# Patient Record
Sex: FEMALE | Race: WHITE | NOT HISPANIC OR LATINO | Employment: UNEMPLOYED | URBAN - METROPOLITAN AREA
[De-identification: names, ages, dates, MRNs, and addresses within clinical notes are randomized per-mention and may not be internally consistent; named-entity substitution may affect disease eponyms.]

---

## 2021-02-23 ENCOUNTER — IMMUNIZATION (OUTPATIENT)
Dept: VACCINE CLINIC | Facility: HOSPITAL | Age: 66
End: 2021-02-23

## 2021-02-23 PROCEDURE — 0001A: CPT | Performed by: INTERNAL MEDICINE

## 2021-02-23 PROCEDURE — 91300 HC SARSCOV02 VAC 30MCG/0.3ML IM: CPT | Performed by: INTERNAL MEDICINE

## 2021-03-16 ENCOUNTER — IMMUNIZATION (OUTPATIENT)
Dept: VACCINE CLINIC | Facility: HOSPITAL | Age: 66
End: 2021-03-16

## 2021-03-16 PROCEDURE — 0002A: CPT | Performed by: INTERNAL MEDICINE

## 2021-03-16 PROCEDURE — 91300 HC SARSCOV02 VAC 30MCG/0.3ML IM: CPT | Performed by: INTERNAL MEDICINE

## 2023-06-27 PROBLEM — Z12.11 COLON CANCER SCREENING: Status: ACTIVE | Noted: 2018-11-19

## 2023-06-27 PROBLEM — R52 PAIN: Status: ACTIVE | Noted: 2023-06-27

## 2023-06-27 PROBLEM — N89.5 VAGINAL STENOSIS: Status: ACTIVE | Noted: 2017-11-17

## 2023-07-13 PROBLEM — M17.12 PRIMARY OSTEOARTHRITIS OF LEFT KNEE: Status: ACTIVE | Noted: 2023-07-13

## 2023-09-07 ENCOUNTER — TELEPHONE (OUTPATIENT)
Dept: ORTHOPEDIC SURGERY | Facility: CLINIC | Age: 68
End: 2023-09-07
Payer: COMMERCIAL

## 2023-09-07 NOTE — TELEPHONE ENCOUNTER
Caller: LYNETTE     Relationship to patient: SELF     Best call back number: [phone]    Chief complaint: L KNEE     Type of visit: SURGERY     Requested date: FOLLOWING WEEK , CLOSER TO [place]      If rescheduling, when is the original appointment: DECEMBER 11TH 2023      Additional notes: PT NEEDS TO RS SX FOR FOLLOWING WEEK

## 2023-09-18 ENCOUNTER — TELEPHONE (OUTPATIENT)
Dept: ORTHOPEDIC SURGERY | Facility: CLINIC | Age: 68
End: 2023-09-18
Payer: COMMERCIAL

## 2023-09-18 DIAGNOSIS — M17.12 PRIMARY OSTEOARTHRITIS OF LEFT KNEE: Primary | ICD-10-CM

## 2023-09-18 NOTE — TELEPHONE ENCOUNTER
Caller: Oumou Jacinto    Relationship: Self    Best call back number: [phone]    What orders are you requesting (i.e. lab or imaging): LEFT KNEE PHYSICAL THERAPY     In what timeframe would the patient need to come in: ASAP     Where will you receive your lab/imaging services: [facility] - PATIENT WOULD LIKE A CALL WHEN THE ORDER IS PLACED

## 2023-09-21 ENCOUNTER — TRANSCRIBE ORDERS (OUTPATIENT)
Dept: ADMINISTRATIVE | Facility: HOSPITAL | Age: 68
End: 2023-09-21

## 2023-09-21 DIAGNOSIS — Z12.31 SCREENING MAMMOGRAM, ENCOUNTER FOR: Primary | ICD-10-CM

## 2023-10-05 ENCOUNTER — HOSPITAL ENCOUNTER (OUTPATIENT)
Dept: MAMMOGRAPHY | Facility: HOSPITAL | Age: 68
Discharge: HOME | End: 2023-10-05
Admitting: OBSTETRICS & GYNECOLOGY
Payer: COMMERCIAL

## 2023-10-05 DIAGNOSIS — Z12.31 SCREENING MAMMOGRAM, ENCOUNTER FOR: ICD-10-CM

## 2023-10-05 PROCEDURE — 77063 BREAST TOMOSYNTHESIS BI: CPT

## 2023-10-05 PROCEDURE — 77067 SCR MAMMO BI INCL CAD: CPT

## 2023-10-10 ENCOUNTER — TREATMENT (OUTPATIENT)
Dept: PHYSICAL THERAPY | Facility: CLINIC | Age: 68
End: 2023-10-10
Payer: COMMERCIAL

## 2023-10-10 DIAGNOSIS — R52 PAIN AGGRAVATED BY STANDING: ICD-10-CM

## 2023-10-10 DIAGNOSIS — M25.662 STIFFNESS OF KNEE JOINT, LEFT: ICD-10-CM

## 2023-10-10 DIAGNOSIS — R52 PAIN AGGRAVATED BY WALKING: ICD-10-CM

## 2023-10-10 DIAGNOSIS — M17.0 PRIMARY OSTEOARTHRITIS OF BOTH KNEES: Primary | ICD-10-CM

## 2023-10-10 PROCEDURE — 97110 THERAPEUTIC EXERCISES: CPT | Performed by: PHYSICAL THERAPIST

## 2023-10-10 PROCEDURE — 97162 PT EVAL MOD COMPLEX 30 MIN: CPT | Performed by: PHYSICAL THERAPIST

## 2023-10-10 NOTE — PROGRESS NOTES
Physical Therapy Initial Evaluation and Plan of Care    [facility] [place] [address]  [phone] (phone)  [phone] (fax)      Patient: Oumou Jacinto   : 1955  Diagnosis/ICD-10 Code:  Primary osteoarthritis of both knees [M17.0]  Referring practitioner: Markus Addison MD  Date of Initial Visit: 10/10/2023  Today's Date: 10/10/2023  Patient seen for 1 sessions    Visit Diagnoses:    ICD-10-CM ICD-9-CM   1. Primary osteoarthritis of both knees  M17.0 715.16   2. Pain aggravated by standing  R52 780.96   3. Stiffness of knee joint, left  M25.662 719.56   4. Pain aggravated by walking  R52 780.96              Subjective Evaluation    History of Present Illness  Onset date: Chronic.  Mechanism of injury: She is scheduled for surgery on 23 for L TKA.   Both knees are bone on bone.  The left knee bothers her more.  She has a more sedentary lifestyle.  She is in remission for autoimmune disorder.  She has seasonal asthma.  Knee hurts with weather changes.  It is difficult to get up and she cannot get down to floor with grand kids.       Patient Occupation: Retired teacher Pain  Current pain ratin  At worst pain ratin  Location: Left knee  Aggravating factors: standing and ambulation    Social Support  Lives in: multiple-level home  Lives with: spouse    Patient Goals  Patient/family treatment goals: prepare for knee surgery.             Objective          Static Posture     Knee   Genu valgus.     Tenderness   Left Knee   Tenderness in the lateral joint line.     Active Range of Motion   Left Knee   Flexion: 111 degrees   Extension: 7 degrees with pain    Right Knee   Flexion: 120 degrees   Extension: 0 degrees     Passive Range of Motion   Left Knee   Extension: 2 degrees with pain    Strength/Myotome Testing     Left Hip   Planes of Motion   Flexion: 4  Abduction: 4    Right Hip   Planes of Motion   Flexion: 4+  Abduction:  4+    Left Knee   Flexion: 4  Extension: 4  Quadriceps contraction: fair    Right Knee   Flexion: 5  Extension: 5  Quadriceps contraction: good    Additional Strength Details  Left knee limited by increased pain     Left Knee Flexibility Comments:   Mild hamstring tightness     Ambulation   Weight-Bearing Status   Assistive device used: none    Observational Gait   Gait: antalgic   Decreased walking speed, left stance time and left step length.     Functional Assessment     Comments  Increased left knee pain lying with left knee extended straight   She feels more comfortable sitting with left leg crossed over right         See Exercise, Manual, and Modality Logs for complete treatment.   Issued HEP:  Straight leg raise  Bridges  Hamstring stretch  Sidelying hip abduction   Ice pack around knee after surgery     Functional Outcome Score: KOS ADL score is 50% ability that is 50% disability     Assessment & Plan       Assessment  Impairments: abnormal gait, abnormal muscle firing, abnormal or restricted ROM, activity intolerance, impaired physical strength, lacks appropriate home exercise program and pain with function   Functional limitations: walking, uncomfortable because of pain, sitting, standing and stooping   Assessment details: Oumou Jacinto is a 67 y.o. year-old female referred to physical therapy for pre-hab for left TKA scheduled for 12/18/23. She presents with an evolving clinical presentation.  She has comorbidities of right knee osteoarthritis and personal factors that she is not very physically active may affect her progress in the plan of care.  Signs and symptoms are consistent with physical therapy diagnosis of end-stage osteoarthritis of the left knee with limited and painful knee flexion, decreased knee strength and impaired functional mobility.  She will benefit from a few sessions for exercises to help her gain knee flexibility and strength prior to surgery..   Prognosis: good    Goals  Plan  Goals: STGs to be achieved by 23    STG 1 Patient will demonstrate an independent HEP for knee strength and ROM/flexibility.    STG 2 Patient will demonstrate increased left knee extension from 2 to 0 for better quadriceps muscle activation     STG 3 Patient will increase her left hip and  knee strength to 4+/5 prior to surgery     LTGs Deferred        Plan  Therapy options: will be seen for skilled therapy services  Planned therapy interventions: home exercise program, therapeutic activities, stretching, strengthening, manual therapy and neuromuscular re-education  Frequency: 2x month  Duration in weeks: 12  Treatment plan discussed with: patient        Timed:         Manual Therapy:    0     mins  14542;     Therapeutic Exercise:    10     mins  76180;     Neuromuscular Park:    0    mins  20975;    Therapeutic Activity:     0     mins  03605;     Gait Trainin     mins  87961;     Ultrasound:     0     mins  23033;    Self Care                       0     mins   79639      Un-Timed:  Electrical Stimulation:    0     mins  28663 ( );  Dry Needling  (1-2 muscles)            0     mins 27136 (Self-pay)  Dry Needling (3-4 muscles) 0     mins 17899 (Self-pay)  Dry Needling Trial    0     mins DRYNDLTRIAL  (No Charge)  Traction     0     mins 15504  Low Eval     0     Mins  18042  Mod Eval     28     Mins  17892  High Eval                       0     Mins  90466    Timed Treatment:   10   mins   Total Treatment:     38   mins    PT SIGNATURE: Harmony Ellis PT     [place] License Number: [id number]    Electronically signed by Harmony Ellis PT, 10/10/23, 2:28 PM EDT    DATE TREATMENT INITIATED: 10/10/2023    Initial Certification  Certification Period: 2024  I certify that the therapy services are furnished while this patient is under my care.  The services outlined above are required by this patient, and will be reviewed every 90 days.     PHYSICIAN: Markus Addison MD   NPI: [id number]                                          DATE:     Please sign and return via fax to [phone] Thank you, [facility].

## 2023-10-27 ENCOUNTER — TELEPHONE (OUTPATIENT)
Dept: ORTHOPEDIC SURGERY | Facility: CLINIC | Age: 68
End: 2023-10-27
Payer: COMMERCIAL

## 2023-10-27 NOTE — TELEPHONE ENCOUNTER
Hub staff attempted to follow warm transfer process and was unsuccessful     Caller: Oumou Jacinto    Relationship to patient: Self    Best call back number: [phone]    Patient is needing: PATIENT RECEIVED A BILL FOR HER UPCOMING SURGERY WITH DR YODER THAT STATED SHE WOULD OWE 68,000 DOLLARS. HER INSURANCE WAS NOT IN THE CHART COMPLETELY CORRECT AND HAS BEEN UPDATED.  THE PATIENT IS NEEDING THIS BILL RAN AGAIN TO MAKE SURE THAT SHE IS ABLE GET THI SX.   SHE HAS OTHER CONCERNS THAT HAVE BEEN SENT IN A SEPARATE MESSAGE TO THE OFFICE.   PLEASE ADVISE THIS PATIENT ASAP.

## 2023-10-27 NOTE — TELEPHONE ENCOUNTER
Per patient insurance for surgery she needs a precert.  Her insurance pays 80/20, no deductible, $1000 OOP and she has met $752.45. per Racquel ATKINSON ref#[id number]    Called and left patient a message

## 2023-11-02 ENCOUNTER — TREATMENT (OUTPATIENT)
Dept: PHYSICAL THERAPY | Facility: CLINIC | Age: 68
End: 2023-11-02
Payer: COMMERCIAL

## 2023-11-02 DIAGNOSIS — M17.0 PRIMARY OSTEOARTHRITIS OF BOTH KNEES: Primary | ICD-10-CM

## 2023-11-02 DIAGNOSIS — R52 PAIN AGGRAVATED BY STANDING: ICD-10-CM

## 2023-11-02 DIAGNOSIS — R52 PAIN AGGRAVATED BY WALKING: ICD-10-CM

## 2023-11-02 DIAGNOSIS — M25.662 STIFFNESS OF KNEE JOINT, LEFT: ICD-10-CM

## 2023-11-02 PROCEDURE — 97530 THERAPEUTIC ACTIVITIES: CPT | Performed by: PHYSICAL THERAPIST

## 2023-11-02 PROCEDURE — 97110 THERAPEUTIC EXERCISES: CPT | Performed by: PHYSICAL THERAPIST

## 2023-11-02 NOTE — PROGRESS NOTES
Physical Therapy Daily Treatment Note    [facility]  [address] [place] [address]  [phone] (phone)  [phone] (fax)    Patient: Oumou Jacinto   : 1955  Diagnosis/ICD-10 Code:  Primary osteoarthritis of both knees [M17.0]  Referring practitioner: Markus Addison MD  Today's Date: 2023  Patient seen for 2 sessions    Visit Diagnoses:    ICD-10-CM ICD-9-CM   1. Primary osteoarthritis of both knees  M17.0 715.16   2. Pain aggravated by standing  R52 780.96   3. Stiffness of knee joint, left  M25.662 719.56   4. Pain aggravated by walking  R52 780.96              Subjective Oumou reports that she has only missed exercises a couple of days and the exercises are going fine.    Objective   See Exercise, Manual, and Modality Logs for complete treatment.   Progressed exercises with:  Increase to 2 sets of 10 reps for leg raises on her back and side  Add standing heel raises   Add standing leg curls  Add standing leg extension   Discussed post-operative expectations for rehab including performing exercises on bed trying yoga mat to make firmer; using walker before progressing to cane; likely having home bike ordered as long as insurance approves.  Discussed how aquatic exercise classes would be good exercise for her and it would be great if she had access to a recumbent bike after performing today felt good to her knee.     Assessment/Plan    Follow up in 2 weeks to see how she is doing with knee exercises to prepare for surgery and schedule for post-op appointments if she would like to proceed.        Timed:    Manual Therapy:    0     mins  42818;  Therapeutic Exercise:    40     mins  75533;     Neuromuscular Park:    0    mins  99239;    Therapeutic Activity:     10     mins  98788;     Gait Trainin    mins  33676;     Ultrasound:     0     mins  05929;    Aquatic Therapy    0     mins 47508;  Self Care                       0     mins    61736        Untimed:  Electrical Stimulation:    0     mins  60278 ( );  Traction:    0     mins  24601;   Dry Needling  (1-2 muscles)            0     mins 20560 (Self-pay)  Dry Needling (3-4 muscles) 0     20561 (Self-pay)  Dry Needling Trial    0     DRYNDLTRIAL  (No Charge)    Timed Treatment:   50   mins   Total Treatment:     50   mins    Harmony Ellis PT  Physical Therapist    [place] License:[id number]

## 2023-11-17 ENCOUNTER — TREATMENT (OUTPATIENT)
Dept: PHYSICAL THERAPY | Facility: CLINIC | Age: 68
End: 2023-11-17
Payer: COMMERCIAL

## 2023-11-17 ENCOUNTER — TELEPHONE (OUTPATIENT)
Dept: PHYSICAL THERAPY | Facility: OTHER | Age: 68
End: 2023-11-17
Payer: COMMERCIAL

## 2023-11-17 DIAGNOSIS — M17.0 PRIMARY OSTEOARTHRITIS OF BOTH KNEES: Primary | ICD-10-CM

## 2023-11-17 DIAGNOSIS — R52 PAIN AGGRAVATED BY WALKING: ICD-10-CM

## 2023-11-17 DIAGNOSIS — R52 PAIN AGGRAVATED BY STANDING: ICD-10-CM

## 2023-11-17 DIAGNOSIS — M25.662 STIFFNESS OF KNEE JOINT, LEFT: ICD-10-CM

## 2023-11-17 PROCEDURE — 97110 THERAPEUTIC EXERCISES: CPT | Performed by: PHYSICAL THERAPIST

## 2023-11-17 NOTE — PROGRESS NOTES
Physical Therapy Daily Treatment Note    [facility]  [address] [place] [address]  [phone] (phone)  [phone] (fax)    Patient: Shin Jacinto   : 1955  Diagnosis/ICD-10 Code:  Primary osteoarthritis of both knees [M17.0]  Referring practitioner: Markus Addison MD  Today's Date: 2023  Patient seen for 3 sessions    Visit Diagnoses:    ICD-10-CM ICD-9-CM   1. Primary osteoarthritis of both knees  M17.0 715.16   2. Pain aggravated by standing  R52 780.96   3. Stiffness of knee joint, left  M25.662 719.56   4. Pain aggravated by walking  R52 780.96              Subjective shin reports that she needs motivation to exercise and is worried about how she will do after surgery.  She initially canceled today because her knee was hurting because of the cold weather but her  encouraged her to some. She reports that she has turned down going to events that require more walking or stairs because she does not think she can walk it.     Objective   See Exercise, Manual, and Modality Logs for complete treatment.   Increased her home exercise repetitions to 2 sets of 15  Discussed the benefits of a longer term exercise program after recovering with her knee to help her gain more strength and endurance     Assessment/Plan    Shin has some intermittent pain in the left knee because the knee does not stay in alignment with all exercises and that is understandable that her knee would hurt.  She demonstrates better knee strength and endurance with exercises compared to first visit. She needs to gain confidence in her ability to regain higher level of function after surgery.          Timed:    Manual Therapy:    0     mins  05801;  Therapeutic Exercise:    43     mins  91170;     Neuromuscular Park:    0    mins  88921;    Therapeutic Activity:     0     mins  07879;     Gait Trainin     mins  71926;     Ultrasound:     0     mins  43887;    Aquatic  Therapy    0     mins 63341;  Self Care                       0     mins   48432        Untimed:  Electrical Stimulation:    0     mins  79061 ( );  Traction:    0     mins  60311;   Dry Needling  (1-2 muscles)            0     mins 20560 (Self-pay)  Dry Needling (3-4 muscles) 0     20561 (Self-pay)  Dry Needling Trial    0     DRYNDLTRIAL  (No Charge)    Timed Treatment:   43   mins   Total Treatment:     43   mins    Harmony Ellis PT  Physical Therapist    [place] License:[id number]

## 2023-11-17 NOTE — TELEPHONE ENCOUNTER
Caller: Oumou Jacinto    Relationship: Self    What was the call regarding: PATIENT CANCELLED APPT TODAY BECAUSE THEY CANT MAKE IT

## 2023-12-01 ENCOUNTER — TREATMENT (OUTPATIENT)
Dept: PHYSICAL THERAPY | Facility: CLINIC | Age: 68
End: 2023-12-01
Payer: COMMERCIAL

## 2023-12-01 DIAGNOSIS — R52 PAIN AGGRAVATED BY WALKING: ICD-10-CM

## 2023-12-01 DIAGNOSIS — M25.662 STIFFNESS OF KNEE JOINT, LEFT: ICD-10-CM

## 2023-12-01 DIAGNOSIS — M17.0 PRIMARY OSTEOARTHRITIS OF BOTH KNEES: Primary | ICD-10-CM

## 2023-12-01 DIAGNOSIS — R52 PAIN AGGRAVATED BY STANDING: ICD-10-CM

## 2023-12-01 PROCEDURE — 97110 THERAPEUTIC EXERCISES: CPT | Performed by: PHYSICAL THERAPIST

## 2023-12-01 NOTE — PROGRESS NOTES
30-Day / 10-Visit Progress and Discharge Note       [facility]  [address] [place] [address]  [phone] (phone)  [phone] (fax)    Patient: Oumou Jacinto   : 1955  Diagnosis/ICD-10 Code:  Primary osteoarthritis of both knees [M17.0]  Referring practitioner: Markus Addison MD  Date of Initial Visit: Type: THERAPY  Noted: 10/10/2023  Today's Date: 2023  Patient seen for 4 sessions      ICD-10-CM ICD-9-CM   1. Primary osteoarthritis of both knees  M17.0 715.16   2. Pain aggravated by standing  R52 780.96   3. Stiffness of knee joint, left  M25.662 719.56   4. Pain aggravated by walking  R52 780.96         Subjective:     Clinical Progress: improved  Home Program Compliance: Yes  Treatment has included:  therapeutic exercise and patient education with home exercise program     Subjective Oumou reports that knee has been bothering her more and she feels more need for the surgery.   Objective          Static Posture     Knee   Genu valgus.     Active Range of Motion   Left Knee   Flexion: 120 degrees   Extension: 5 degrees     Additional Active Range of Motion Details  Crepitus left knee     Passive Range of Motion   Left Knee   Extension: 5 degrees     Strength/Myotome Testing     Left Hip   Planes of Motion   Flexion: 4+  Abduction: 4+  Adduction: 4+    Right Hip   Planes of Motion   Flexion: 4+  Abduction: 4+  Adduction: 4+    Left Knee   Flexion: 4  Extension: 4    Right Knee   Flexion: 4+  Extension: 4+        See Exercise, Manual, and Modality Logs for complete treatment.   Continue with home exercises through surgery     Assessment & Plan       Assessment  Impairments: abnormal gait, abnormal muscle firing, abnormal or restricted ROM, activity intolerance, impaired physical strength, lacks appropriate home exercise program and pain with function   Functional limitations: walking, uncomfortable because of pain, sitting, standing and stooping    Assessment details: Oumou Jacinto is a 67 y.o. year-old female referred to physical therapy for pre-hab for left TKA scheduled for 12/18/23. She has been seen for 4 sessions for knee strength and flexibility with HEP. She demonstrates greater ease with the exercises as she has gained better strength in her hips.  She does avoid some weight bearing on the left knee because of joint popping and some pain.  I feel that she is more prepared for surgery and is scheduled for her post-operative outpatient therapy appointments with long term goals to be able to walk further.   Prognosis: good    Goals  Plan Goals: STGs to be achieved by 11/21/23    STG 1 Patient will demonstrate an independent HEP for knee strength and ROM/flexibility.  Met  STG 2 Patient will demonstrate increased left knee extension from 2 to 0 for better quadriceps muscle activation   Not met   STG 3 Patient will increase her left hip and  knee strength to 4+/5 prior to surgery   Partially met  LTGs Deferred        Plan  Therapy options: will be seen for skilled therapy services  Planned therapy interventions: home exercise program  Plan details: L TKA 12/18/23  Scheduled for post-operative outpatient PT starting 01/08/24           Recommendations: Discharge  Timeframe: 1 week  Prognosis to achieve goals: good    PT Signature: Harmony Ellis PT    [place] License Number: [id number]    Electronically signed by Harmony Ellis PT, 12/01/23, 2:05 PM EST      Based upon review of the patient's progress and continued therapy plan, it is my medical opinion that Oumou Jacinto should continue physical therapy treatment at [facility]  [address] [place] [address] [address] [place] [address]  [phone].    Signature: __________________________________  Markus Addison MD    Timed:  Manual Therapy:    0     mins  63645;  Therapeutic Exercise:    50     mins  79793;     Neuromuscular Park:    0    mins  22700;    Therapeutic  Activity:     0     mins  75554;     Gait Trainin     mins  95712;     Ultrasound:     0     mins  51528;      Untimed:  Electrical Stimulation:    0     mins  77435 ( );  Traction:    0     mins  89599;   Dry Needling  (1-2 muscles)            0     mins  (Self-pay)  Dry Needling (3-4 muscles) 0     mins  (Self-pay)  Dry Needling Trial    0     mins DRYNDLTRIAL  (No Charge)      Timed Treatment:   50   mins   Total Treatment:     50   mins

## 2023-12-07 ENCOUNTER — PRE-ADMISSION TESTING (OUTPATIENT)
Dept: PREADMISSION TESTING | Facility: HOSPITAL | Age: 68
End: 2023-12-07
Payer: COMMERCIAL

## 2023-12-07 VITALS
HEIGHT: 69 IN | RESPIRATION RATE: 18 BRPM | WEIGHT: 248 LBS | BODY MASS INDEX: 36.73 KG/M2 | HEART RATE: 88 BPM | OXYGEN SATURATION: 97 % | TEMPERATURE: 97.8 F

## 2023-12-07 LAB
ANION GAP SERPL CALCULATED.3IONS-SCNC: 12 MMOL/L (ref 5–15)
BUN SERPL-MCNC: 12 MG/DL (ref 8–23)
BUN/CREAT SERPL: 15.2 (ref 7–25)
CALCIUM SPEC-SCNC: 9.6 MG/DL (ref 8.6–10.5)
CHLORIDE SERPL-SCNC: 103 MMOL/L (ref 98–107)
CO2 SERPL-SCNC: 24 MMOL/L (ref 22–29)
CREAT SERPL-MCNC: 0.79 MG/DL (ref 0.57–1)
DEPRECATED RDW RBC AUTO: 42.7 FL (ref 37–54)
EGFRCR SERPLBLD CKD-EPI 2021: 82.6 ML/MIN/1.73
ERYTHROCYTE [DISTWIDTH] IN BLOOD BY AUTOMATED COUNT: 13.1 % (ref 12.3–15.4)
GLUCOSE SERPL-MCNC: 104 MG/DL (ref 65–99)
HCT VFR BLD AUTO: 42.9 % (ref 34–46.6)
HGB BLD-MCNC: 14 G/DL (ref 12–15.9)
MCH RBC QN AUTO: 29 PG (ref 26.6–33)
MCHC RBC AUTO-ENTMCNC: 32.6 G/DL (ref 31.5–35.7)
MCV RBC AUTO: 88.8 FL (ref 79–97)
PLATELET # BLD AUTO: 370 10*3/MM3 (ref 140–450)
PMV BLD AUTO: 10.4 FL (ref 6–12)
POTASSIUM SERPL-SCNC: 4.1 MMOL/L (ref 3.5–5.2)
QT INTERVAL: 422 MS
QTC INTERVAL: 452 MS
RBC # BLD AUTO: 4.83 10*6/MM3 (ref 3.77–5.28)
SODIUM SERPL-SCNC: 139 MMOL/L (ref 136–145)
WBC NRBC COR # BLD AUTO: 6.86 10*3/MM3 (ref 3.4–10.8)

## 2023-12-07 PROCEDURE — 93005 ELECTROCARDIOGRAM TRACING: CPT

## 2023-12-07 PROCEDURE — 80048 BASIC METABOLIC PNL TOTAL CA: CPT

## 2023-12-07 PROCEDURE — 36415 COLL VENOUS BLD VENIPUNCTURE: CPT

## 2023-12-07 PROCEDURE — 85027 COMPLETE CBC AUTOMATED: CPT

## 2023-12-07 RX ORDER — LEVALBUTEROL INHALATION SOLUTION 0.63 MG/3ML
SOLUTION RESPIRATORY (INHALATION)
COMMUNITY
Start: 2023-08-19

## 2023-12-07 RX ORDER — ALBUTEROL SULFATE 90 UG/1
2 AEROSOL, METERED RESPIRATORY (INHALATION) EVERY 4 HOURS PRN
COMMUNITY

## 2023-12-07 NOTE — DISCHARGE INSTRUCTIONS
ARRIVE DAY OF SURGERY AS NOTIFIED BY DR HOOPER OFFICE        Take the following medications the morning of surgery:  NADOLOL      If you are on prescription narcotic pain medication to control your pain you may also take that medication the morning of surgery.    General Instructions:  Do not eat solid food after midnight the night before surgery.  You may drink clear liquids day of surgery but must stop at least one hour before your hospital arrival time.  It is beneficial for you to have a clear drink that contains carbohydrates the day of surgery.  We suggest a 12 to 20 ounce bottle of Gatorade or Powerade for non-diabetic patients or a 12 to 20 ounce bottle of G2 or Powerade Zero for diabetic patients. (Pediatric patients, are not advised to drink a 12 to 20 ounce carbohydrate drink)    Clear liquids are liquids you can see through.  Nothing red in color.     Plain water                               Sports drinks  Sodas                                   Gelatin (Jell-O)  Fruit juices without pulp such as white grape juice and apple juice  Popsicles that contain no fruit or yogurt  Tea or coffee (no cream or milk added)  Gatorade / Powerade  G2 / Powerade Zero    Infants may have breast milk up to four hours before surgery.  Infants drinking formula may drink formula up to six hours before surgery.   Patients who avoid smoking, chewing tobacco and alcohol for 4 weeks prior to surgery have a reduced risk of post-operative complications.  Quit smoking as many days before surgery as you can.  Do not smoke, use chewing tobacco or drink alcohol the day of surgery.   If applicable bring your C-PAP/ BI-PAP machine in with you to preop day of surgery.  Bring any papers given to you in the doctor’s office.  Wear clean comfortable clothes.  Do not wear contact lenses, false eyelashes or make-up.  Bring a case for your glasses.   Bring crutches or walker if applicable.  Remove all piercings.  Leave jewelry and any other  valuables at home.  Hair extensions with metal clips must be removed prior to surgery.  The Pre-Admission Testing nurse will instruct you to bring medications if unable to obtain an accurate list in Pre-Admission Testing.          Preventing a Surgical Site Infection:  For 2 to 3 days before surgery, avoid shaving with a razor because the razor can irritate skin and make it easier to develop an infection.    Any areas of open skin can increase the risk of a post-operative wound infection by allowing bacteria to enter and travel throughout the body.  Notify your surgeon if you have any skin wounds / rashes even if it is not near the expected surgical site.  The area will need assessed to determine if surgery should be delayed until it is healed.  The night prior to surgery shower using a fresh bar of anti-bacterial soap (such as Dial) and clean washcloth.  Sleep in a clean bed with clean clothing.  Do not allow pets to sleep with you.  Shower on the morning of surgery using a fresh bar of anti-bacterial soap (such as Dial) and clean washcloth.  Dry with a clean towel and dress in clean clothing.  Ask your surgeon if you will be receiving antibiotics prior to surgery.  Make sure you, your family, and all healthcare providers clean their hands with soap and water or an alcohol based hand  before caring for you or your wound.    Day of surgery:  Your arrival time is approximately two hours before your scheduled surgery time.  Upon arrival, a Pre-op nurse and Anesthesiologist will review your health history, obtain vital signs, and answer questions you may have.  The only belongings needed at this time will be a list of your home medications and if applicable your C-PAP/BI-PAP machine.  A Pre-op nurse will start an IV and you may receive medication in preparation for surgery, including something to help you relax.     Please be aware that surgery does come with discomfort.  We want to make every effort to  control your discomfort so please discuss any uncontrolled symptoms with your nurse.   Your doctor will most likely have prescribed pain medications.      If you are going home after surgery you will receive individualized written care instructions before being discharged.  A responsible adult must drive you to and from the hospital on the day of your surgery and stay with you for 24 hours.  Discharge prescriptions can be filled by the hospital pharmacy during regular pharmacy hours.  If you are having surgery late in the day/evening your prescription may be e-prescribed to your pharmacy.  Please verify your pharmacy hours or chose a 24 hour pharmacy to avoid not having access to your prescription because your pharmacy has closed for the day.    If you are staying overnight following surgery, you will be transported to your hospital room following the recovery period.  The Medical Center has all private rooms.    If you have any questions please call Pre-Admission Testing at [phone].  Deductibles and co-payments are collected on the day of service. Please be prepared to pay the required co-pay, deductible or deposit on the day of service as defined by your plan.    Call your surgeon immediately if you experience any of the following symptoms:  Sore Throat  Shortness of Breath or difficulty breathing  Cough  Chills  Body soreness or muscle pain  Headache  Fever  New loss of taste or smell  Do not arrive for your surgery ill.  Your procedure will need to be rescheduled to another time.  You will need to call your physician before the day of surgery to avoid any unnecessary exposure to hospital staff as well as other patients.    CHLORHEXIDINE CLOTH INSTRUCTIONS  The morning of surgery follow these instructions using the Chlorhexidine cloths you've been given.  These steps reduce bacteria on the body.  Do not use the cloths near your eyes, ears mouth, genitalia or on open wounds.  Throw the cloths away  after use but do not try to flush them down a toilet.      Open and remove one cloth at a time from the package.    Leave the cloth unfolded and begin the bathing.  Massage the skin with the cloths using gentle pressure to remove bacteria.  Do not scrub harshly.   Follow the steps below with one 2% CHG cloth per area (6 total cloths).  One cloth for neck, shoulders and chest.  One cloth for both arms, hands, fingers and underarms (do underarms last).  One cloth for the abdomen followed by groin.  One cloth for right leg and foot including between the toes.  One cloth for left leg and foot including between the toes.  The last cloth is to be used for the back of the neck, back and buttocks.    Allow the CHG to air dry 3 minutes on the skin which will give it time to work and decrease the chance of irritation.  The skin may feel sticky until it is dry.  Do not rinse with water or any other liquid or you will lose the beneficial effects of the CHG.  If mild skin irritation occurs, do rinse the skin to remove the CHG.  Report this to the nurse at time of admission.  Do not apply lotions, creams, ointments, deodorants or perfumes after using the clothes. Dress in clean clothes before coming to the hospital.

## 2023-12-08 ENCOUNTER — TELEPHONE (OUTPATIENT)
Dept: ORTHOPEDIC SURGERY | Facility: HOSPITAL | Age: 68
End: 2023-12-08
Payer: COMMERCIAL

## 2023-12-14 ENCOUNTER — OFFICE VISIT (OUTPATIENT)
Dept: ORTHOPEDIC SURGERY | Facility: CLINIC | Age: 68
End: 2023-12-14
Payer: COMMERCIAL

## 2023-12-14 VITALS
HEIGHT: 70 IN | SYSTOLIC BLOOD PRESSURE: 138 MMHG | WEIGHT: 241.3 LBS | DIASTOLIC BLOOD PRESSURE: 88 MMHG | TEMPERATURE: 96.4 F | BODY MASS INDEX: 34.54 KG/M2

## 2023-12-14 DIAGNOSIS — R52 PAIN: Primary | ICD-10-CM

## 2023-12-14 NOTE — H&P (VIEW-ONLY)
Patient: Oumou Jacinto    Date of Admission: 12/18/2023    YOB: 1955    Medical Record Number: [id number]    Admitting Physician: Dr. Markus Addison    Reason for Admission: End Stage Left Knee OA    History of Present Illness: 68 y.o. female presents with severe end stage knee osteoarthritis which has not been responsive to the full compliment of conservative measures. Despite conservative attempts, there is still severe, constant activity-limiting pain. Given the severity of the pain, the patient has elected to proceed with knee replacement.    Allergies:   Allergies   Allergen Reactions    Penicillins Swelling and Rash     OF TONGUE         Current Medications:  Home Medications:    Current Outpatient Medications on File Prior to Visit   Medication Sig    albuterol sulfate  (90 Base) MCG/ACT inhaler Inhale 2 puffs Every 4 (Four) Hours As Needed for Wheezing.    clonazePAM (KlonoPIN) 0.5 MG tablet TAKE 1 TABLET BY MOUTH FOUR TIMES A DAY AS NEEDED ANXIETY    levalbuterol (XOPENEX) 0.63 MG/3ML nebulizer solution 1 UNIT INHALE THREE TIMES A DAY, AS NEEDED FOR SHORTNESS OF BREATH, VIA NEBULIZER.    montelukast (SINGULAIR) 10 MG tablet Take 1 tablet by mouth Every Night.    multivitamin with minerals (MULTIVITAL PO) Take 1 tablet by mouth Daily.    nadolol (CORGARD) 20 MG tablet Take 1 tablet by mouth Daily.    PARoxetine (PAXIL) 20 MG tablet Take 2 tablets by mouth Daily.    VITAMIN D PO Take 1 tablet by mouth Daily.     No current facility-administered medications on file prior to visit.     PRN Meds:.    PMH:     Past Medical History:   Diagnosis Date    Anxiety     Arthritis     Asthma     Hypertension     Left knee pain     Pemphigus vulgaris        PF/Surg/Soc Hx:     Past Surgical History:   Procedure Laterality Date    LAPAROSCOPIC CHOLECYSTECTOMY          Social History     Occupational History    Not on file   Tobacco Use    Smoking status: Never    Smokeless tobacco: Never   Vaping Use  "   Vaping Use: Never used   Substance and Sexual Activity    Alcohol use: Never     Comment: quit 10-15 years ago    Drug use: Never    Sexual activity: Defer      Social History     Social History Narrative    Not on file        Family History   Problem Relation Age of Onset    Malig Hyperthermia Neg Hx          Review of Systems:   A 14 point review of systems was performed, pertinent positives discussed above, all other systems are negative    Physical Exam: 68 y.o. female  Vital Signs :   Vitals:    12/14/23 1334   BP: 138/88   Temp: 96.4 °F (35.8 °C)   Weight: 109 kg (241 lb 4.8 oz)   Height: 177.8 cm (70\")   PainSc:   4   PainLoc: Knee     General: Alert and Oriented x 3, No acute distress.  Psych: mood and affect appropriate; recent and remote memory intact  Eyes: conjunctivae clear; pupils equally round and reactive, sclerae anicteric  CV: no peripheral edema  Resp: normal respiratory effort  Skin: no rashes or wounds; normal turgor  Musculosketetal; pain and crepitance with knee range of motion  Vascular: palpable distal pulses    Xrays:  -3 views (AP, lateral, and sunrise) were reviewed demonstrating end-stage OA with bone on bone articulation.  -A full length AP xray was ordered and reviewed today for purposes of operative alignment demonstrating end stage arthritic findings. There are no previous full length films for review    Assessment:  End-stage Left knee osteoarthritis. Conservative measures have failed.      Plan:  The plan is to proceed with Left Total Knee Replacement. The patient voiced understanding of the risks, benefits, and alternative forms of treatment that were discussed with Dr Addison at the time of scheduling.  Same day [facility]    Leeanne Huang, APRN  12/14/2023         "

## 2023-12-14 NOTE — H&P
Patient: Oumou Jacinto    Date of Admission: 12/18/2023    YOB: 1955    Medical Record Number: [id number]    Admitting Physician: Dr. Markus Addison    Reason for Admission: End Stage Left Knee OA    History of Present Illness: 68 y.o. female presents with severe end stage knee osteoarthritis which has not been responsive to the full compliment of conservative measures. Despite conservative attempts, there is still severe, constant activity-limiting pain. Given the severity of the pain, the patient has elected to proceed with knee replacement.    Allergies:   Allergies   Allergen Reactions    Penicillins Swelling and Rash     OF TONGUE         Current Medications:  Home Medications:    Current Outpatient Medications on File Prior to Visit   Medication Sig    albuterol sulfate  (90 Base) MCG/ACT inhaler Inhale 2 puffs Every 4 (Four) Hours As Needed for Wheezing.    clonazePAM (KlonoPIN) 0.5 MG tablet TAKE 1 TABLET BY MOUTH FOUR TIMES A DAY AS NEEDED ANXIETY    levalbuterol (XOPENEX) 0.63 MG/3ML nebulizer solution 1 UNIT INHALE THREE TIMES A DAY, AS NEEDED FOR SHORTNESS OF BREATH, VIA NEBULIZER.    montelukast (SINGULAIR) 10 MG tablet Take 1 tablet by mouth Every Night.    multivitamin with minerals (MULTIVITAL PO) Take 1 tablet by mouth Daily.    nadolol (CORGARD) 20 MG tablet Take 1 tablet by mouth Daily.    PARoxetine (PAXIL) 20 MG tablet Take 2 tablets by mouth Daily.    VITAMIN D PO Take 1 tablet by mouth Daily.     No current facility-administered medications on file prior to visit.     PRN Meds:.    PMH:     Past Medical History:   Diagnosis Date    Anxiety     Arthritis     Asthma     Hypertension     Left knee pain     Pemphigus vulgaris        PF/Surg/Soc Hx:     Past Surgical History:   Procedure Laterality Date    LAPAROSCOPIC CHOLECYSTECTOMY          Social History     Occupational History    Not on file   Tobacco Use    Smoking status: Never    Smokeless tobacco: Never   Vaping Use  "   Vaping Use: Never used   Substance and Sexual Activity    Alcohol use: Never     Comment: quit 10-15 years ago    Drug use: Never    Sexual activity: Defer      Social History     Social History Narrative    Not on file        Family History   Problem Relation Age of Onset    Malig Hyperthermia Neg Hx          Review of Systems:   A 14 point review of systems was performed, pertinent positives discussed above, all other systems are negative    Physical Exam: 68 y.o. female  Vital Signs :   Vitals:    12/14/23 1334   BP: 138/88   Temp: 96.4 °F (35.8 °C)   Weight: 109 kg (241 lb 4.8 oz)   Height: 177.8 cm (70\")   PainSc:   4   PainLoc: Knee     General: Alert and Oriented x 3, No acute distress.  Psych: mood and affect appropriate; recent and remote memory intact  Eyes: conjunctivae clear; pupils equally round and reactive, sclerae anicteric  CV: no peripheral edema  Resp: normal respiratory effort  Skin: no rashes or wounds; normal turgor  Musculosketetal; pain and crepitance with knee range of motion  Vascular: palpable distal pulses    Xrays:  -3 views (AP, lateral, and sunrise) were reviewed demonstrating end-stage OA with bone on bone articulation.  -A full length AP xray was ordered and reviewed today for purposes of operative alignment demonstrating end stage arthritic findings. There are no previous full length films for review    Assessment:  End-stage Left knee osteoarthritis. Conservative measures have failed.      Plan:  The plan is to proceed with Left Total Knee Replacement. The patient voiced understanding of the risks, benefits, and alternative forms of treatment that were discussed with Dr Addison at the time of scheduling.  Same day [facility]    Leeanne Huang, APRN  12/14/2023         "

## 2023-12-18 ENCOUNTER — ANESTHESIA EVENT (OUTPATIENT)
Dept: PERIOP | Facility: HOSPITAL | Age: 68
End: 2023-12-18
Payer: COMMERCIAL

## 2023-12-18 ENCOUNTER — APPOINTMENT (OUTPATIENT)
Dept: GENERAL RADIOLOGY | Facility: HOSPITAL | Age: 68
End: 2023-12-18
Payer: COMMERCIAL

## 2023-12-18 ENCOUNTER — HOSPITAL ENCOUNTER (OUTPATIENT)
Facility: HOSPITAL | Age: 68
Discharge: HOME HEALTH | End: 2023-12-18
Attending: ORTHOPAEDIC SURGERY | Admitting: ORTHOPAEDIC SURGERY
Payer: COMMERCIAL

## 2023-12-18 ENCOUNTER — ANESTHESIA (OUTPATIENT)
Dept: PERIOP | Facility: HOSPITAL | Age: 68
End: 2023-12-18
Payer: COMMERCIAL

## 2023-12-18 ENCOUNTER — HOME HEALTH ADMISSION (OUTPATIENT)
Dept: HOME HEALTH SERVICES | Facility: HOME HEALTHCARE | Age: 68
End: 2023-12-18
Payer: MEDICARE

## 2023-12-18 VITALS
SYSTOLIC BLOOD PRESSURE: 104 MMHG | RESPIRATION RATE: 16 BRPM | DIASTOLIC BLOOD PRESSURE: 72 MMHG | HEART RATE: 68 BPM | TEMPERATURE: 98.3 F | OXYGEN SATURATION: 93 %

## 2023-12-18 DIAGNOSIS — M17.12 PRIMARY OSTEOARTHRITIS OF LEFT KNEE: ICD-10-CM

## 2023-12-18 DIAGNOSIS — Z96.652 S/P TKR (TOTAL KNEE REPLACEMENT), LEFT: Primary | ICD-10-CM

## 2023-12-18 PROCEDURE — 25010000002 DEXAMETHASONE PER 1 MG: Performed by: STUDENT IN AN ORGANIZED HEALTH CARE EDUCATION/TRAINING PROGRAM

## 2023-12-18 PROCEDURE — 25010000002 ROPIVACAINE PER 1 MG: Performed by: ORTHOPAEDIC SURGERY

## 2023-12-18 PROCEDURE — 25010000002 HYDROMORPHONE PER 4 MG: Performed by: NURSE ANESTHETIST, CERTIFIED REGISTERED

## 2023-12-18 PROCEDURE — C1713 ANCHOR/SCREW BN/BN,TIS/BN: HCPCS | Performed by: ORTHOPAEDIC SURGERY

## 2023-12-18 PROCEDURE — 25810000003 LACTATED RINGERS SOLUTION: Performed by: ORTHOPAEDIC SURGERY

## 2023-12-18 PROCEDURE — C1776 JOINT DEVICE (IMPLANTABLE): HCPCS | Performed by: ORTHOPAEDIC SURGERY

## 2023-12-18 PROCEDURE — 25010000002 MORPHINE PER 10 MG: Performed by: ORTHOPAEDIC SURGERY

## 2023-12-18 PROCEDURE — 97110 THERAPEUTIC EXERCISES: CPT

## 2023-12-18 PROCEDURE — 25810000003 LACTATED RINGERS PER 1000 ML: Performed by: NURSE ANESTHETIST, CERTIFIED REGISTERED

## 2023-12-18 PROCEDURE — 25010000002 EPINEPHRINE 1 MG/ML SOLUTION 30 ML VIAL: Performed by: ORTHOPAEDIC SURGERY

## 2023-12-18 PROCEDURE — 25010000002 KETOROLAC TROMETHAMINE PER 15 MG: Performed by: ORTHOPAEDIC SURGERY

## 2023-12-18 PROCEDURE — 25010000002 VANCOMYCIN 10 G RECONSTITUTED SOLUTION: Performed by: ORTHOPAEDIC SURGERY

## 2023-12-18 PROCEDURE — 94799 UNLISTED PULMONARY SVC/PX: CPT

## 2023-12-18 PROCEDURE — 25810000003 LACTATED RINGERS PER 1000 ML: Performed by: STUDENT IN AN ORGANIZED HEALTH CARE EDUCATION/TRAINING PROGRAM

## 2023-12-18 PROCEDURE — 25010000002 PHENYLEPHRINE 10 MG/ML SOLUTION: Performed by: NURSE ANESTHETIST, CERTIFIED REGISTERED

## 2023-12-18 PROCEDURE — 73560 X-RAY EXAM OF KNEE 1 OR 2: CPT

## 2023-12-18 PROCEDURE — 25010000002 PROPOFOL 200 MG/20ML EMULSION: Performed by: NURSE ANESTHETIST, CERTIFIED REGISTERED

## 2023-12-18 PROCEDURE — 25010000002 PROPOFOL 10 MG/ML EMULSION: Performed by: NURSE ANESTHETIST, CERTIFIED REGISTERED

## 2023-12-18 PROCEDURE — 25010000002 FENTANYL CITRATE (PF) 100 MCG/2ML SOLUTION: Performed by: NURSE ANESTHETIST, CERTIFIED REGISTERED

## 2023-12-18 PROCEDURE — 97161 PT EVAL LOW COMPLEX 20 MIN: CPT

## 2023-12-18 PROCEDURE — 94640 AIRWAY INHALATION TREATMENT: CPT

## 2023-12-18 PROCEDURE — 94760 N-INVAS EAR/PLS OXIMETRY 1: CPT

## 2023-12-18 PROCEDURE — 25010000002 ONDANSETRON PER 1 MG: Performed by: NURSE ANESTHETIST, CERTIFIED REGISTERED

## 2023-12-18 PROCEDURE — 25010000002 MIDAZOLAM PER 1 MG: Performed by: STUDENT IN AN ORGANIZED HEALTH CARE EDUCATION/TRAINING PROGRAM

## 2023-12-18 PROCEDURE — 25010000002 ROPIVACAINE PER 1 MG: Performed by: STUDENT IN AN ORGANIZED HEALTH CARE EDUCATION/TRAINING PROGRAM

## 2023-12-18 PROCEDURE — 25810000003 SODIUM CHLORIDE 0.9 % SOLUTION: Performed by: ORTHOPAEDIC SURGERY

## 2023-12-18 PROCEDURE — 25010000002 CLINDAMYCIN 900 MG/50ML SOLUTION: Performed by: ORTHOPAEDIC SURGERY

## 2023-12-18 PROCEDURE — 94761 N-INVAS EAR/PLS OXIMETRY MLT: CPT

## 2023-12-18 PROCEDURE — 25010000002 ACETAMINOPHEN 10 MG/ML SOLUTION: Performed by: NURSE ANESTHETIST, CERTIFIED REGISTERED

## 2023-12-18 PROCEDURE — 25010000002 SUGAMMADEX 200 MG/2ML SOLUTION: Performed by: NURSE ANESTHETIST, CERTIFIED REGISTERED

## 2023-12-18 PROCEDURE — 25010000002 FENTANYL CITRATE (PF) 50 MCG/ML SOLUTION: Performed by: STUDENT IN AN ORGANIZED HEALTH CARE EDUCATION/TRAINING PROGRAM

## 2023-12-18 DEVICE — IMPLANTABLE DEVICE: Type: IMPLANTABLE DEVICE | Status: FUNCTIONAL

## 2023-12-18 DEVICE — KNOTLESS TISSUE CONTROL DEVICE, UNDYED UNIDIRECTIONAL (ANTIBACTERIAL) SYNTHETIC ABSORBABLE DEVICE
Type: IMPLANTABLE DEVICE | Site: KNEE | Status: FUNCTIONAL
Brand: STRATAFIX

## 2023-12-18 DEVICE — LEGION CRUCIATE RETAINING OXINIUM                                    FEMORAL SIZE 6 LEFT
Type: IMPLANTABLE DEVICE | Site: KNEE | Status: FUNCTIONAL
Brand: LEGION

## 2023-12-18 DEVICE — LEGION CR HIGH FLEX XLPE SZ 5-6 9MM
Type: IMPLANTABLE DEVICE | Site: KNEE | Status: FUNCTIONAL
Brand: LEGION

## 2023-12-18 DEVICE — GENESIS II BICONVEX PATELLA 32MM
Type: IMPLANTABLE DEVICE | Site: KNEE | Status: FUNCTIONAL
Brand: GENESIS II

## 2023-12-18 DEVICE — GENESIS II NON-POROUS TIBIAL                                    BASEPLATE SIZE 5 LEFT
Type: IMPLANTABLE DEVICE | Site: KNEE | Status: FUNCTIONAL
Brand: GENESIS II

## 2023-12-18 DEVICE — PALACOS® R IS A FAST-CURING, RADIOPAQUE, POLY(METHYL METHACRYLATE)-BASED BONE CEMENT.PALACOS ® R CONTAINS THE X-RAY CONTRAST MEDIUM ZIRCONIUM DIOXIDE. TO IMPROVE VISIBILITY IN THE SURGICAL FIELD PALACOS ® R HAS BEEN COLOURED WITH CHLOROPHYLL (E141). THE BONE CEMENT IS PREPARED DIRECTLY BEFORE USE BY MIXING A POLYMER POWDER COMPONENT WITH A LIQUID MONOMER COMPONENT. A DUCTILE DOUGH FORMS WHICH CURES WITHIN A FEW MINUTES.
Type: IMPLANTABLE DEVICE | Site: KNEE | Status: FUNCTIONAL
Brand: PALACOS®

## 2023-12-18 DEVICE — VIOLET ANTIBACTERIAL POLYDIOXANONE, KNOTLESS TISSUE CONTROL DEVICE
Type: IMPLANTABLE DEVICE | Site: KNEE | Status: FUNCTIONAL
Brand: STRATAFIX

## 2023-12-18 RX ORDER — ONDANSETRON 4 MG/1
4 TABLET, FILM COATED ORAL EVERY 6 HOURS PRN
Status: CANCELLED | OUTPATIENT
Start: 2023-12-18

## 2023-12-18 RX ORDER — CHLORHEXIDINE GLUCONATE 500 MG/1
CLOTH TOPICAL 2 TIMES DAILY
Status: DISCONTINUED | OUTPATIENT
Start: 2023-12-18 | End: 2023-12-18 | Stop reason: HOSPADM

## 2023-12-18 RX ORDER — POLYETHYLENE GLYCOL 3350 17 G/17G
17 POWDER, FOR SOLUTION ORAL 2 TIMES DAILY
Qty: 238 G | Refills: 0 | Status: SHIPPED | OUTPATIENT
Start: 2023-12-18 | End: 2023-12-25

## 2023-12-18 RX ORDER — PROMETHAZINE HYDROCHLORIDE 25 MG/1
12.5 TABLET ORAL EVERY 4 HOURS PRN
Status: CANCELLED | OUTPATIENT
Start: 2023-12-18

## 2023-12-18 RX ORDER — MELOXICAM 15 MG/1
15 TABLET ORAL ONCE
Status: COMPLETED | OUTPATIENT
Start: 2023-12-18 | End: 2023-12-18

## 2023-12-18 RX ORDER — DEXAMETHASONE SODIUM PHOSPHATE 4 MG/ML
INJECTION, SOLUTION INTRA-ARTICULAR; INTRALESIONAL; INTRAMUSCULAR; INTRAVENOUS; SOFT TISSUE
Status: COMPLETED | OUTPATIENT
Start: 2023-12-18 | End: 2023-12-18

## 2023-12-18 RX ORDER — LABETALOL HYDROCHLORIDE 5 MG/ML
5 INJECTION, SOLUTION INTRAVENOUS
Status: DISCONTINUED | OUTPATIENT
Start: 2023-12-18 | End: 2023-12-18 | Stop reason: HOSPADM

## 2023-12-18 RX ORDER — FLUMAZENIL 0.1 MG/ML
0.2 INJECTION INTRAVENOUS AS NEEDED
Status: DISCONTINUED | OUTPATIENT
Start: 2023-12-18 | End: 2023-12-18 | Stop reason: HOSPADM

## 2023-12-18 RX ORDER — ROPIVACAINE HYDROCHLORIDE 5 MG/ML
INJECTION, SOLUTION EPIDURAL; INFILTRATION; PERINEURAL
Status: COMPLETED | OUTPATIENT
Start: 2023-12-18 | End: 2023-12-18

## 2023-12-18 RX ORDER — EPHEDRINE SULFATE 50 MG/ML
5 INJECTION, SOLUTION INTRAVENOUS ONCE AS NEEDED
Status: DISCONTINUED | OUTPATIENT
Start: 2023-12-18 | End: 2023-12-18 | Stop reason: HOSPADM

## 2023-12-18 RX ORDER — NALOXONE HCL 0.4 MG/ML
0.2 VIAL (ML) INJECTION AS NEEDED
Status: DISCONTINUED | OUTPATIENT
Start: 2023-12-18 | End: 2023-12-18 | Stop reason: HOSPADM

## 2023-12-18 RX ORDER — ALBUTEROL SULFATE 90 UG/1
AEROSOL, METERED RESPIRATORY (INHALATION) AS NEEDED
Status: DISCONTINUED | OUTPATIENT
Start: 2023-12-18 | End: 2023-12-18 | Stop reason: SURG

## 2023-12-18 RX ORDER — SODIUM CHLORIDE, SODIUM LACTATE, POTASSIUM CHLORIDE, CALCIUM CHLORIDE 600; 310; 30; 20 MG/100ML; MG/100ML; MG/100ML; MG/100ML
9 INJECTION, SOLUTION INTRAVENOUS CONTINUOUS
Status: DISCONTINUED | OUTPATIENT
Start: 2023-12-18 | End: 2023-12-18 | Stop reason: HOSPADM

## 2023-12-18 RX ORDER — PREGABALIN 75 MG/1
150 CAPSULE ORAL ONCE
Status: COMPLETED | OUTPATIENT
Start: 2023-12-18 | End: 2023-12-18

## 2023-12-18 RX ORDER — ALBUTEROL SULFATE 90 UG/1
2 AEROSOL, METERED RESPIRATORY (INHALATION) EVERY 4 HOURS PRN
Status: CANCELLED | OUTPATIENT
Start: 2023-12-18

## 2023-12-18 RX ORDER — CLONAZEPAM 0.5 MG/1
0.5 TABLET ORAL EVERY 8 HOURS PRN
Status: CANCELLED | OUTPATIENT
Start: 2023-12-18 | End: 2023-12-25

## 2023-12-18 RX ORDER — HYDROCODONE BITARTRATE AND ACETAMINOPHEN 5; 325 MG/1; MG/1
2 TABLET ORAL EVERY 4 HOURS PRN
Status: CANCELLED | OUTPATIENT
Start: 2023-12-18 | End: 2023-12-25

## 2023-12-18 RX ORDER — PROPOFOL 10 MG/ML
INJECTION, EMULSION INTRAVENOUS AS NEEDED
Status: DISCONTINUED | OUTPATIENT
Start: 2023-12-18 | End: 2023-12-18 | Stop reason: SURG

## 2023-12-18 RX ORDER — LIDOCAINE HYDROCHLORIDE 10 MG/ML
0.5 INJECTION, SOLUTION INFILTRATION; PERINEURAL ONCE AS NEEDED
Status: DISCONTINUED | OUTPATIENT
Start: 2023-12-18 | End: 2023-12-18 | Stop reason: HOSPADM

## 2023-12-18 RX ORDER — ACETAMINOPHEN 10 MG/ML
INJECTION, SOLUTION INTRAVENOUS AS NEEDED
Status: DISCONTINUED | OUTPATIENT
Start: 2023-12-18 | End: 2023-12-18 | Stop reason: SURG

## 2023-12-18 RX ORDER — DROPERIDOL 2.5 MG/ML
0.62 INJECTION, SOLUTION INTRAMUSCULAR; INTRAVENOUS
Status: DISCONTINUED | OUTPATIENT
Start: 2023-12-18 | End: 2023-12-18 | Stop reason: HOSPADM

## 2023-12-18 RX ORDER — UREA 10 %
1 LOTION (ML) TOPICAL NIGHTLY PRN
Status: CANCELLED | OUTPATIENT
Start: 2023-12-18

## 2023-12-18 RX ORDER — NADOLOL 20 MG/1
20 TABLET ORAL DAILY
Status: CANCELLED | OUTPATIENT
Start: 2023-12-18

## 2023-12-18 RX ORDER — LIDOCAINE HYDROCHLORIDE 20 MG/ML
INJECTION, SOLUTION EPIDURAL; INFILTRATION; INTRACAUDAL; PERINEURAL AS NEEDED
Status: DISCONTINUED | OUTPATIENT
Start: 2023-12-18 | End: 2023-12-18 | Stop reason: SURG

## 2023-12-18 RX ORDER — HYDRALAZINE HYDROCHLORIDE 20 MG/ML
5 INJECTION INTRAMUSCULAR; INTRAVENOUS
Status: DISCONTINUED | OUTPATIENT
Start: 2023-12-18 | End: 2023-12-18 | Stop reason: HOSPADM

## 2023-12-18 RX ORDER — LIDOCAINE HYDROCHLORIDE 10 MG/ML
0.5 INJECTION, SOLUTION INFILTRATION; PERINEURAL ONCE AS NEEDED
Status: DISCONTINUED | OUTPATIENT
Start: 2023-12-18 | End: 2023-12-18 | Stop reason: SDUPTHER

## 2023-12-18 RX ORDER — ROCURONIUM BROMIDE 10 MG/ML
INJECTION, SOLUTION INTRAVENOUS AS NEEDED
Status: DISCONTINUED | OUTPATIENT
Start: 2023-12-18 | End: 2023-12-18 | Stop reason: SURG

## 2023-12-18 RX ORDER — MIDAZOLAM HYDROCHLORIDE 1 MG/ML
0.5 INJECTION INTRAMUSCULAR; INTRAVENOUS
Status: DISCONTINUED | OUTPATIENT
Start: 2023-12-18 | End: 2023-12-18 | Stop reason: HOSPADM

## 2023-12-18 RX ORDER — ACETAMINOPHEN 325 MG/1
650 TABLET ORAL EVERY 6 HOURS PRN
Status: CANCELLED | OUTPATIENT
Start: 2023-12-18 | End: 2023-12-21

## 2023-12-18 RX ORDER — ASPIRIN 81 MG/1
81 TABLET ORAL EVERY 12 HOURS SCHEDULED
Status: CANCELLED | OUTPATIENT
Start: 2023-12-19

## 2023-12-18 RX ORDER — IPRATROPIUM BROMIDE AND ALBUTEROL SULFATE 2.5; .5 MG/3ML; MG/3ML
3 SOLUTION RESPIRATORY (INHALATION) ONCE AS NEEDED
Status: COMPLETED | OUTPATIENT
Start: 2023-12-18 | End: 2023-12-18

## 2023-12-18 RX ORDER — SODIUM CHLORIDE 0.9 % (FLUSH) 0.9 %
3-10 SYRINGE (ML) INJECTION AS NEEDED
Status: DISCONTINUED | OUTPATIENT
Start: 2023-12-18 | End: 2023-12-18 | Stop reason: HOSPADM

## 2023-12-18 RX ORDER — PROMETHAZINE HYDROCHLORIDE 25 MG/1
25 TABLET ORAL ONCE AS NEEDED
Status: DISCONTINUED | OUTPATIENT
Start: 2023-12-18 | End: 2023-12-18 | Stop reason: HOSPADM

## 2023-12-18 RX ORDER — FENTANYL CITRATE 50 UG/ML
50 INJECTION, SOLUTION INTRAMUSCULAR; INTRAVENOUS ONCE AS NEEDED
Status: COMPLETED | OUTPATIENT
Start: 2023-12-18 | End: 2023-12-18

## 2023-12-18 RX ORDER — ONDANSETRON 4 MG/1
4 TABLET, FILM COATED ORAL EVERY 8 HOURS PRN
Qty: 10 TABLET | Refills: 0 | Status: SHIPPED | OUTPATIENT
Start: 2023-12-18

## 2023-12-18 RX ORDER — HYDROCODONE BITARTRATE AND ACETAMINOPHEN 7.5; 325 MG/1; MG/1
1 TABLET ORAL EVERY 4 HOURS PRN
Status: DISCONTINUED | OUTPATIENT
Start: 2023-12-18 | End: 2023-12-18 | Stop reason: HOSPADM

## 2023-12-18 RX ORDER — MELOXICAM 15 MG/1
15 TABLET ORAL DAILY
Qty: 14 TABLET | Refills: 0 | Status: SHIPPED | OUTPATIENT
Start: 2023-12-18

## 2023-12-18 RX ORDER — HYDROCODONE BITARTRATE AND ACETAMINOPHEN 5; 325 MG/1; MG/1
1-2 TABLET ORAL EVERY 4 HOURS PRN
Qty: 56 TABLET | Refills: 0 | Status: SHIPPED | OUTPATIENT
Start: 2023-12-18

## 2023-12-18 RX ORDER — MAGNESIUM HYDROXIDE 1200 MG/15ML
LIQUID ORAL AS NEEDED
Status: DISCONTINUED | OUTPATIENT
Start: 2023-12-18 | End: 2023-12-18 | Stop reason: HOSPADM

## 2023-12-18 RX ORDER — HYDROMORPHONE HYDROCHLORIDE 1 MG/ML
0.25 INJECTION, SOLUTION INTRAMUSCULAR; INTRAVENOUS; SUBCUTANEOUS
Status: DISCONTINUED | OUTPATIENT
Start: 2023-12-18 | End: 2023-12-18 | Stop reason: HOSPADM

## 2023-12-18 RX ORDER — CEFAZOLIN SODIUM 2 G/100ML
2 INJECTION, SOLUTION INTRAVENOUS ONCE
Status: DISCONTINUED | OUTPATIENT
Start: 2023-12-18 | End: 2023-12-18

## 2023-12-18 RX ORDER — SODIUM CHLORIDE 0.9 % (FLUSH) 0.9 %
3 SYRINGE (ML) INJECTION EVERY 12 HOURS SCHEDULED
Status: DISCONTINUED | OUTPATIENT
Start: 2023-12-18 | End: 2023-12-18 | Stop reason: HOSPADM

## 2023-12-18 RX ORDER — DEXAMETHASONE SODIUM PHOSPHATE 4 MG/ML
INJECTION, SOLUTION INTRA-ARTICULAR; INTRALESIONAL; INTRAMUSCULAR; INTRAVENOUS; SOFT TISSUE AS NEEDED
Status: DISCONTINUED | OUTPATIENT
Start: 2023-12-18 | End: 2023-12-18 | Stop reason: SURG

## 2023-12-18 RX ORDER — HYDROCODONE BITARTRATE AND ACETAMINOPHEN 5; 325 MG/1; MG/1
1 TABLET ORAL ONCE AS NEEDED
Status: DISCONTINUED | OUTPATIENT
Start: 2023-12-18 | End: 2023-12-18 | Stop reason: HOSPADM

## 2023-12-18 RX ORDER — TRANEXAMIC ACID 100 MG/ML
INJECTION, SOLUTION INTRAVENOUS AS NEEDED
Status: DISCONTINUED | OUTPATIENT
Start: 2023-12-18 | End: 2023-12-18 | Stop reason: SURG

## 2023-12-18 RX ORDER — DIPHENHYDRAMINE HYDROCHLORIDE 50 MG/ML
12.5 INJECTION INTRAMUSCULAR; INTRAVENOUS
Status: DISCONTINUED | OUTPATIENT
Start: 2023-12-18 | End: 2023-12-18 | Stop reason: HOSPADM

## 2023-12-18 RX ORDER — ONDANSETRON 2 MG/ML
4 INJECTION INTRAMUSCULAR; INTRAVENOUS ONCE AS NEEDED
Status: CANCELLED | OUTPATIENT
Start: 2023-12-18

## 2023-12-18 RX ORDER — CLINDAMYCIN PHOSPHATE 900 MG/50ML
900 INJECTION INTRAVENOUS ONCE
Status: COMPLETED | OUTPATIENT
Start: 2023-12-18 | End: 2023-12-18

## 2023-12-18 RX ORDER — PANTOPRAZOLE SODIUM 40 MG/1
40 TABLET, DELAYED RELEASE ORAL DAILY
Qty: 14 TABLET | Refills: 0 | Status: SHIPPED | OUTPATIENT
Start: 2023-12-18 | End: 2024-01-01

## 2023-12-18 RX ORDER — CEFAZOLIN SODIUM 2 G/100ML
2000 INJECTION, SOLUTION INTRAVENOUS EVERY 8 HOURS
Status: CANCELLED | OUTPATIENT
Start: 2023-12-18 | End: 2023-12-18

## 2023-12-18 RX ORDER — HYDROCODONE BITARTRATE AND ACETAMINOPHEN 5; 325 MG/1; MG/1
1 TABLET ORAL ONCE AS NEEDED
Status: CANCELLED | OUTPATIENT
Start: 2023-12-18 | End: 2023-12-25

## 2023-12-18 RX ORDER — ONDANSETRON 2 MG/ML
4 INJECTION INTRAMUSCULAR; INTRAVENOUS ONCE AS NEEDED
Status: DISCONTINUED | OUTPATIENT
Start: 2023-12-18 | End: 2023-12-18 | Stop reason: HOSPADM

## 2023-12-18 RX ORDER — ALBUTEROL SULFATE 2.5 MG/3ML
1.25 SOLUTION RESPIRATORY (INHALATION) EVERY 6 HOURS PRN
Status: CANCELLED | OUTPATIENT
Start: 2023-12-18

## 2023-12-18 RX ORDER — SODIUM CHLORIDE, SODIUM LACTATE, POTASSIUM CHLORIDE, CALCIUM CHLORIDE 600; 310; 30; 20 MG/100ML; MG/100ML; MG/100ML; MG/100ML
INJECTION, SOLUTION INTRAVENOUS CONTINUOUS PRN
Status: DISCONTINUED | OUTPATIENT
Start: 2023-12-18 | End: 2023-12-18 | Stop reason: SURG

## 2023-12-18 RX ORDER — FENTANYL CITRATE 50 UG/ML
25 INJECTION, SOLUTION INTRAMUSCULAR; INTRAVENOUS
Status: DISCONTINUED | OUTPATIENT
Start: 2023-12-18 | End: 2023-12-18 | Stop reason: HOSPADM

## 2023-12-18 RX ORDER — MONTELUKAST SODIUM 10 MG/1
10 TABLET ORAL NIGHTLY
Status: CANCELLED | OUTPATIENT
Start: 2023-12-18

## 2023-12-18 RX ORDER — ASPIRIN 81 MG/1
TABLET ORAL
Qty: 60 TABLET | Refills: 0 | Status: SHIPPED | OUTPATIENT
Start: 2023-12-18 | End: 2024-01-29

## 2023-12-18 RX ORDER — ONDANSETRON 2 MG/ML
INJECTION INTRAMUSCULAR; INTRAVENOUS AS NEEDED
Status: DISCONTINUED | OUTPATIENT
Start: 2023-12-18 | End: 2023-12-18 | Stop reason: SURG

## 2023-12-18 RX ORDER — FENTANYL CITRATE 50 UG/ML
INJECTION, SOLUTION INTRAMUSCULAR; INTRAVENOUS AS NEEDED
Status: DISCONTINUED | OUTPATIENT
Start: 2023-12-18 | End: 2023-12-18 | Stop reason: SURG

## 2023-12-18 RX ORDER — PHENYLEPHRINE HYDROCHLORIDE 10 MG/ML
INJECTION INTRAVENOUS AS NEEDED
Status: DISCONTINUED | OUTPATIENT
Start: 2023-12-18 | End: 2023-12-18 | Stop reason: SURG

## 2023-12-18 RX ORDER — PROMETHAZINE HYDROCHLORIDE 25 MG/1
25 SUPPOSITORY RECTAL ONCE AS NEEDED
Status: DISCONTINUED | OUTPATIENT
Start: 2023-12-18 | End: 2023-12-18 | Stop reason: HOSPADM

## 2023-12-18 RX ORDER — PAROXETINE HYDROCHLORIDE 20 MG/1
40 TABLET, FILM COATED ORAL DAILY
Status: CANCELLED | OUTPATIENT
Start: 2023-12-18

## 2023-12-18 RX ORDER — HYDROCODONE BITARTRATE AND ACETAMINOPHEN 5; 325 MG/1; MG/1
1 TABLET ORAL EVERY 4 HOURS PRN
Status: CANCELLED | OUTPATIENT
Start: 2023-12-18 | End: 2023-12-25

## 2023-12-18 RX ORDER — ZINC GLUCONATE 50 MG
50 TABLET ORAL
COMMUNITY
End: 2023-12-18 | Stop reason: HOSPADM

## 2023-12-18 RX ADMIN — HYDROCODONE BITARTRATE AND ACETAMINOPHEN 1 TABLET: 7.5; 325 TABLET ORAL at 13:42

## 2023-12-18 RX ADMIN — ACETAMINOPHEN 1000 MG: 10 INJECTION, SOLUTION INTRAVENOUS at 07:14

## 2023-12-18 RX ADMIN — HYDROMORPHONE HYDROCHLORIDE 0.25 MG: 1 INJECTION, SOLUTION INTRAMUSCULAR; INTRAVENOUS; SUBCUTANEOUS at 09:48

## 2023-12-18 RX ADMIN — FENTANYL CITRATE 50 MCG: 50 INJECTION, SOLUTION INTRAMUSCULAR; INTRAVENOUS at 08:48

## 2023-12-18 RX ADMIN — TRANEXAMIC ACID 1000 MG: 100 INJECTION INTRAVENOUS at 07:59

## 2023-12-18 RX ADMIN — PHENYLEPHRINE HYDROCHLORIDE 200 MCG: 10 INJECTION INTRAVENOUS at 07:25

## 2023-12-18 RX ADMIN — ALBUTEROL SULFATE 2 PUFF: 90 AEROSOL, METERED RESPIRATORY (INHALATION) at 08:37

## 2023-12-18 RX ADMIN — FENTANYL CITRATE 50 MCG: 50 INJECTION, SOLUTION INTRAMUSCULAR; INTRAVENOUS at 07:05

## 2023-12-18 RX ADMIN — LIDOCAINE HYDROCHLORIDE 60 MG: 20 INJECTION, SOLUTION EPIDURAL; INFILTRATION; INTRACAUDAL; PERINEURAL at 08:29

## 2023-12-18 RX ADMIN — LIDOCAINE HYDROCHLORIDE 60 MG: 20 INJECTION, SOLUTION EPIDURAL; INFILTRATION; INTRACAUDAL; PERINEURAL at 07:07

## 2023-12-18 RX ADMIN — PROPOFOL 20 MG: 10 INJECTION, EMULSION INTRAVENOUS at 07:05

## 2023-12-18 RX ADMIN — PHENYLEPHRINE HYDROCHLORIDE 200 MCG: 10 INJECTION INTRAVENOUS at 08:05

## 2023-12-18 RX ADMIN — CLINDAMYCIN PHOSPHATE 900 MG: 900 INJECTION, SOLUTION INTRAVENOUS at 07:07

## 2023-12-18 RX ADMIN — PREGABALIN 150 MG: 75 CAPSULE ORAL at 06:18

## 2023-12-18 RX ADMIN — SODIUM CHLORIDE, POTASSIUM CHLORIDE, SODIUM LACTATE AND CALCIUM CHLORIDE 9 ML/HR: 600; 310; 30; 20 INJECTION, SOLUTION INTRAVENOUS at 09:16

## 2023-12-18 RX ADMIN — IPRATROPIUM BROMIDE AND ALBUTEROL SULFATE 3 ML: 2.5; .5 SOLUTION RESPIRATORY (INHALATION) at 09:17

## 2023-12-18 RX ADMIN — HYDROCODONE BITARTRATE AND ACETAMINOPHEN 1 TABLET: 7.5; 325 TABLET ORAL at 09:27

## 2023-12-18 RX ADMIN — DEXAMETHASONE SODIUM PHOSPHATE 12 MG: 4 INJECTION, SOLUTION INTRA-ARTICULAR; INTRALESIONAL; INTRAMUSCULAR; INTRAVENOUS; SOFT TISSUE at 07:20

## 2023-12-18 RX ADMIN — FENTANYL CITRATE 50 MCG: 50 INJECTION, SOLUTION INTRAMUSCULAR; INTRAVENOUS at 06:42

## 2023-12-18 RX ADMIN — VANCOMYCIN HYDROCHLORIDE 1750 MG: 10 INJECTION, POWDER, LYOPHILIZED, FOR SOLUTION INTRAVENOUS at 06:29

## 2023-12-18 RX ADMIN — DEXAMETHASONE SODIUM PHOSPHATE 4 MG: 4 INJECTION, SOLUTION INTRA-ARTICULAR; INTRALESIONAL; INTRAMUSCULAR; INTRAVENOUS; SOFT TISSUE at 06:42

## 2023-12-18 RX ADMIN — PHENYLEPHRINE HYDROCHLORIDE 200 MCG: 10 INJECTION INTRAVENOUS at 07:15

## 2023-12-18 RX ADMIN — ROCURONIUM BROMIDE 10 MG: 10 INJECTION, SOLUTION INTRAVENOUS at 07:29

## 2023-12-18 RX ADMIN — SODIUM CHLORIDE, POTASSIUM CHLORIDE, SODIUM LACTATE AND CALCIUM CHLORIDE: 600; 310; 30; 20 INJECTION, SOLUTION INTRAVENOUS at 06:58

## 2023-12-18 RX ADMIN — ROPIVACAINE HYDROCHLORIDE 15 ML: 5 INJECTION EPIDURAL; INFILTRATION; PERINEURAL at 06:42

## 2023-12-18 RX ADMIN — ONDANSETRON 4 MG: 2 INJECTION INTRAMUSCULAR; INTRAVENOUS at 08:04

## 2023-12-18 RX ADMIN — SUGAMMADEX 200 MG: 100 INJECTION, SOLUTION INTRAVENOUS at 08:18

## 2023-12-18 RX ADMIN — MELOXICAM 15 MG: 15 TABLET ORAL at 06:18

## 2023-12-18 RX ADMIN — PROPOFOL 150 MG: 10 INJECTION, EMULSION INTRAVENOUS at 07:07

## 2023-12-18 RX ADMIN — SODIUM CHLORIDE, POTASSIUM CHLORIDE, SODIUM LACTATE AND CALCIUM CHLORIDE 500 ML: 600; 310; 30; 20 INJECTION, SOLUTION INTRAVENOUS at 06:22

## 2023-12-18 RX ADMIN — ROCURONIUM BROMIDE 40 MG: 10 INJECTION, SOLUTION INTRAVENOUS at 07:07

## 2023-12-18 RX ADMIN — MIDAZOLAM HYDROCHLORIDE 1 MG: 2 INJECTION, SOLUTION INTRAMUSCULAR; INTRAVENOUS at 06:42

## 2023-12-18 RX ADMIN — PROPOFOL 150 MCG/KG/MIN: 10 INJECTION, EMULSION INTRAVENOUS at 07:14

## 2023-12-18 NOTE — DISCHARGE PLACEMENT REQUEST
"Oumou Jacinto (68 y.o. Female)       Date of Birth   1955    Social Security Number       Address   [address] [address]    Home Phone   [phone]    MRN   [id number]       Yazidism   Uats[employer]    Marital Status                               Admission Date   12/18/23    Admission Type   Elective    Admitting Provider   Markus Addison MD    Attending Provider   Markus Addison MD    Department, Room/Bed   [facility] OR, [facility] OR       Discharge Date       [place] Dis[place] [facility] [place] Destination                                 Attending Provider: Markus Addison MD    Allergies: Penicillins    Isolation: None   Infection: None   Code Status: Not on file    Ht: 177.8 cm (70\")   Wt: 109 kg (241 lb 4.8 oz)    Admission Cmt: None   Principal Problem: Primary osteoarthritis of left knee [M17.12]                   Active Insurance as of 12/18/2023       Primary Coverage       Payor Plan Insurance Group Employer/Plan Group    MISC COMMERCIAL MISC COMMERCIAL [id number]       Coverage Address Coverage Phone Number Coverage Fax Number Effective Dates    [address] [phone]  6/27/2023 - None Entered    [facility] [id number]         Subscriber Name Subscriber Birth Date Member ID       CHARAN JACINTO [id number]                     Emergency Contacts        (Rel.) Home Phone Work Phone Mobile Phone    Charan Jacinto (Spouse) -- -- [phone]              "

## 2023-12-18 NOTE — CASE MANAGEMENT/SOCIAL WORK
Continued Stay Note  [facility]     Patient Name: Oumou Jacinto  MRN: [id number]  Today's Date: 12/18/2023    Admit Date: 12/18/2023    Plan: Home with [facility]   Discharge Plan       Row Name 12/18/23 1053       Plan    Plan Home with [facility]    Patient/Family in Agreement with Plan yes    Provided Post Acute Provider List? Yes    Post Acute Provider List Home Health    Delivered To Patient    Method of Delivery Telephone                   Discharge Codes    No documentation.                 Expected Discharge Date and Time       Expected Discharge Date Expected Discharge Time    Dec 18, 2023               Shannon Epley, RN

## 2023-12-18 NOTE — PLAN OF CARE
Goal Outcome Evaluation:      Patient is a pleasant 68 y.o. female POD0 L TKA with expected post op weakness and impaired functional mobility- seen today in [facility]. Patient is ind at baseline and lives at home with spouse- access to RW. Today, patient required SV for transfers and ambulated 100' SV with a RW. No safety concerns demo'd. Patient able to complete 10 MIPs bilaterally with no issues. Reviewed stair sequencing verbally. Based on current clinical presentation, patient okay to DC home today with assist and  PT to follow up. No further acute PT needs.        Anticipated Discharge Disposition (PT): home with home health, home with assist

## 2023-12-18 NOTE — ANESTHESIA PROCEDURE NOTES
Peripheral Block    Pre-sedation assessment completed: 12/18/2023 6:40 AM    Patient reassessed immediately prior to procedure    Patient location during procedure: holding area  Start time: 12/18/2023 6:42 AM  Stop time: 12/18/2023 6:46 AM  Reason for block: at surgeon's request and post-op pain management  Performed by  Anesthesiologist: Jeff Patten MD  Preanesthetic Checklist  Completed: patient identified, IV checked, site marked, risks and benefits discussed, surgical consent, monitors and equipment checked, pre-op evaluation and timeout performed  Prep:  Pt Position: supine  Sterile barriers:cap, washed/disinfected hands, gloves, mask and alcohol skin prep  Prep: ChloraPrep  Patient monitoring: blood pressure monitoring, continuous pulse oximetry and EKG  Procedure    Sedation: yes  Performed under: local infiltration  Guidance:ultrasound guided    ULTRASOUND INTERPRETATION.  Using ultrasound guidance a 21 G gauge needle was placed in close proximity to the nerve, at which point, under ultrasound guidance anesthetic was injected in the area of the nerve and spread of the anesthesia was seen on ultrasound in close proximity thereto.  There were no abnormalities seen on ultrasound; a digital image was taken; and the patient tolerated the procedure with no complications. Images:still images obtained, printed/placed on chart    Laterality:left  Block Type:adductor canal block  Injection Technique:single-shot  Needle Type:echogenic and Tuohy  Needle Gauge:21 G  Resistance on Injection: none    Medications Used: dexamethasone (DECADRON) injection - Injection   4 mg - 12/18/2023 6:42:00 AM  ropivacaine (NAROPIN) 0.5 % injection - Injection   15 mL - 12/18/2023 6:42:00 AM      Post Assessment  Injection Assessment: negative aspiration for heme, no paresthesia on injection and incremental injection  Patient Tolerance:comfortable throughout block  Complications:no  Additional Notes  Ultrasound guidance used to  visualize nerve anatomy, guide needle placement and verify local anesthetic disbursement.

## 2023-12-18 NOTE — ANESTHESIA PROCEDURE NOTES
Airway  Urgency: elective    Date/Time: 12/18/2023 7:13 AM  Airway not difficult    General Information and Staff    Patient location during procedure: OR  Anesthesiologist: Jeff Patten MD  CRNA/CAA: Laurita Brown CRNA    Indications and Patient Condition  Indications for airway management: airway protection    Preoxygenated: yes  Mask difficulty assessment: 2 - vent by mask + OA or adjuvant +/- NMBA    Final Airway Details  Final airway type: endotracheal airway      Successful airway: ETT  Cuffed: yes   Successful intubation technique: direct laryngoscopy  Facilitating devices/methods: intubating stylet  Endotracheal tube insertion site: oral  Blade: Santana  Blade size: 2  ETT size (mm): 7.0  Cormack-Lehane Classification: grade I - full view of glottis  Placement verified by: chest auscultation and capnometry   Inital cuff pressure (cm H2O): 19  Cuff volume (mL): 8  Measured from: lips  ETT/EBT  to lips (cm): 21  Number of attempts at approach: 1  Assessment: lips, teeth, and gum same as pre-op and atraumatic intubation

## 2023-12-18 NOTE — THERAPY EVALUATION
Patient Name: Oumou Jacinto  : 1955    MRN: [id number]                              Today's Date: 2023       Admit Date: 2023    Visit Dx:     ICD-10-CM ICD-9-CM   1. S/P TKR (total knee replacement), left  Z96.652 V43.65   2. Primary osteoarthritis of left knee  M17.12 715.16     Patient Active Problem List   Diagnosis    Colon cancer screening    Pemphigus vulgaris    Vaginal stenosis    Pain    Primary osteoarthritis of left knee     Past Medical History:   Diagnosis Date    Anxiety     Arthritis     Asthma     Hypertension     Left knee pain     Pemphigus vulgaris      Past Surgical History:   Procedure Laterality Date    LAPAROSCOPIC CHOLECYSTECTOMY        General Information       Row Name 23 1318          Physical Therapy Time and Intention    Document Type discharge evaluation/summary  -MS     Mode of Treatment physical therapy  -MS       Row Name 23 1318          General Information    Patient Profile Reviewed yes  -MS     Prior Level of Function independent:;all household mobility  -MS     Existing Precautions/Restrictions fall  -MS     Barriers to Rehab none identified  -MS       Row Name 23 1318          Living Environment    People in Home spouse  -MS       Row Name 23 1318          Home Main Entrance    Number of Stairs, Main Entrance three  -MS       Row Name 23 1318          Stairs Within Home, Primary    Number of Stairs, Within Home, Primary twelve  -MS       Row Name 23 1318          Cognition    Orientation Status (Cognition) oriented x 4  -MS       Row Name 23 1318          Safety Issues, Functional Mobility    Safety Issues Affecting Function (Mobility) positioning of assistive device;sequencing abilities  -MS     Impairments Affecting Function (Mobility) balance;strength;endurance/activity tolerance;pain  -MS     Comment, Safety Issues/Impairments (Mobility) Gait belt and non skid socks donned.  -MS               User Key  (r) =  Recorded By, (t) = Taken By, (c) = Cosigned By      Initials Name Provider Type    Madelin Leonard, PT Physical Therapist                   Mobility       Row Name 12/18/23 1318          Bed Mobility    Comment, (Bed Mobility) NT- UIC  -MS       Row Name 12/18/23 1318          Transfers    Comment, (Transfers) Sequencing and hand placement cues. Initial LH but subsided.  -MS       Row Name 12/18/23 1318          Sit-Stand Transfer    Sit-Stand Gray (Transfers) supervision;verbal cues  -MS     Assistive Device (Sit-Stand Transfers) walker, front-wheeled  -MS       Row Name 12/18/23 1318          Gait/Stairs (Locomotion)    Gray Level (Gait) supervision;verbal cues  -MS     Assistive Device (Gait) walker, front-wheeled  -MS     Patient was able to Ambulate yes  -MS     Distance in Feet (Gait) 100'  -MS     Deviations/Abnormal Patterns (Gait) nura decreased;gait speed decreased  -MS     Bilateral Gait Deviations forward flexed posture  -MS     Comment, (Gait/Stairs) Sequencing cues. No overt LOB or veering noted. Reviewed stair sequencing verbally.  -MS       Row Name 12/18/23 1318          Mobility    Extremity Weight-bearing Status left lower extremity  -MS     Left Lower Extremity (Weight-bearing Status) weight-bearing as tolerated (WBAT)  -MS               User Key  (r) = Recorded By, (t) = Taken By, (c) = Cosigned By      Initials Name Provider Type    MS CaraballoMadelin, PT Physical Therapist                   Obj/Interventions       Row Name 12/18/23 1319          Range of Motion Comprehensive    Comment, General Range of Motion L LE limited 2/2 pain  -MS       Row Name 12/18/23 1319          Strength Comprehensive (MMT)    Comment, General Manual Muscle Testing (MMT) Assessment L LE post op weakness  -MS       Row Name 12/18/23 1319          Motor Skills    Therapeutic Exercise --  MIPs x 10 B  -MS       Row Name 12/18/23 1319          Balance    Balance Assessment sitting static  balance;standing static balance  -MS     Static Sitting Balance standby assist  -MS     Position, Sitting Balance sitting edge of bed  -MS     Static Standing Balance contact guard  -MS     Position/Device Used, Standing Balance supported;walker, rolling  -MS       Row Name 12/18/23 1319          Sensory Assessment (Somatosensory)    Sensory Assessment (Somatosensory) sensation intact  -MS               User Key  (r) = Recorded By, (t) = Taken By, (c) = Cosigned By      Initials Name Provider Type    Madelin Leonard, PT Physical Therapist                   Goals/Plan    No documentation.                  Clinical Impression       Row Name 12/18/23 1320          Pain    Pretreatment Pain Rating 5/10  -MS     Posttreatment Pain Rating 5/10  -MS     Pain Location - Side/Orientation Left  -MS     Pain Location lower  -MS     Pain Location - knee  -MS     Pain Intervention(s) Cold applied;Repositioned;Ambulation/increased activity;Rest  -MS       Row Name 12/18/23 1320          Therapy Assessment/Plan (PT)    Criteria for Skilled Interventions Met (PT) no;does not meet criteria for skilled intervention  -MS     Therapy Frequency (PT) evaluation only  -MS       Row Name 12/18/23 1320          Vital Signs    O2 Delivery Pre Treatment room air  -MS       Row Name 12/18/23 1320          Positioning and Restraints    Pre-Treatment Position sitting in chair/recliner  -MS     Post Treatment Position chair  -MS     In Chair reclined;with nsg;call light within reach;encouraged to call for assist  -MS               User Key  (r) = Recorded By, (t) = Taken By, (c) = Cosigned By      Initials Name Provider Type    Madelin Leonard, PT Physical Therapist                   Outcome Measures       Row Name 12/18/23 1320          How much help from another person do you currently need...    Turning from your back to your side while in flat bed without using bedrails? 4  -MS     Moving from lying on back to sitting on the side  of a flat bed without bedrails? 4  -MS     Moving to and from a bed to a chair (including a wheelchair)? 4  -MS     Standing up from a chair using your arms (e.g., wheelchair, bedside chair)? 4  -MS     Climbing 3-5 steps with a railing? 4  -MS     To walk in hospital room? 4  -MS     AM-PAC 6 Clicks Score (PT) 24  -MS     Highest Level of Mobility Goal 8 --> Walked 250 feet or more  -MS       Row Name 12/18/23 1320          Functional Assessment    Outcome Measure Options AM-PAC 6 Clicks Basic Mobility (PT)  -MS               User Key  (r) = Recorded By, (t) = Taken By, (c) = Cosigned By      Initials Name Provider Type    Madelin Leonard, PT Physical Therapist                                   PT Recommendation and Plan           Time Calculation:         PT Charges       Row Name 12/18/23 1307             Time Calculation    Start Time 1205  -MS      Stop Time 1218  -MS      Time Calculation (min) 13 min  -MS      PT Received On 12/18/23  -MS      PT - Next Appointment 12/18/23  -MS                User Key  (r) = Recorded By, (t) = Taken By, (c) = Cosigned By      Initials Name Provider Type    Madelin Leonard, PT Physical Therapist                  Therapy Charges for Today       Code Description Service Date Service Provider Modifiers Qty    51289770110  PT EVAL LOW COMPLEXITY 2 12/18/2023 Madelin Caraballo, PT GP 1    [id number]  PT THER PROC EA 15 MIN 12/18/2023 Madelin Caraballo, PT GP 1            PT G-Codes  Outcome Measure Options: AM-PAC 6 Clicks Basic Mobility (PT)  AM-PAC 6 Clicks Score (PT): 24  PT Discharge Summary  Anticipated Discharge Disposition (PT): home with home health, home with assist    Madelin Caraballo PT  12/18/2023

## 2023-12-18 NOTE — ANESTHESIA POSTPROCEDURE EVALUATION
Patient: Oumou Jacinto    Procedure Summary       Date: 12/18/23 Room / Location: [facility] OR [address] [place] [address] [facility] OR [facility]    Anesthesia Start: 0658 Anesthesia Stop: 0849    Procedure: TOTAL KNEE ARTHROPLASTY (Left: Knee) Diagnosis:       Primary osteoarthritis of left knee      (Primary osteoarthritis of left knee [M17.12])    Surgeons: Markus Addison MD Provider: Jeff Patten MD    Anesthesia Type: general with block ASA Status: 3            Anesthesia Type: general with block    Vitals  Vitals Value Taken Time   /89 12/18/23 0945   Temp 36.4 °C (97.6 °F) 12/18/23 0848   Pulse 79 12/18/23 0946   Resp 14 12/18/23 0929   SpO2 98 % 12/18/23 0946   Vitals shown include unfiled device data.        Post Anesthesia Care and Evaluation    Patient location during evaluation: bedside  Patient participation: complete - patient participated  Level of consciousness: awake and alert  Pain management: adequate    Airway patency: patent  Anesthetic complications: No anesthetic complications  PONV Status: controlled  Cardiovascular status: blood pressure returned to baseline and acceptable  Respiratory status: acceptable  Hydration status: acceptable

## 2023-12-18 NOTE — ANESTHESIA PREPROCEDURE EVALUATION
Anesthesia Evaluation     Patient summary reviewed and Nursing notes reviewed   no history of anesthetic complications:   NPO Solid Status: > 8 hours  NPO Liquid Status: > 2 hours           Airway   Mallampati: II  TM distance: >3 FB  Neck ROM: full  Dental      Pulmonary    (+) asthma,wheezes  Cardiovascular     ECG reviewed    (+) hypertension    ROS comment: EKG SR    Neuro/Psych  (+) psychiatric history Anxiety  GI/Hepatic/Renal/Endo    (+) obesity    Musculoskeletal     Abdominal   (+) obese   Substance History      OB/GYN          Other                          Anesthesia Plan    ASA 3     general with block     (Duo-neb ordered pre-op. Patient states that when weather changes it sometimes triggers her asthma)  intravenous induction     Anesthetic plan, risks, benefits, and alternatives have been provided, discussed and informed consent has been obtained with: patient.        CODE STATUS:          03-Nov-2017 11:00 06-Nov-2017 11:30 07-Nov-2017 10:00

## 2023-12-18 NOTE — DISCHARGE INSTRUCTIONS
What to expect after a Nerve Block    Nerve blocks administered to block pain affect many types of nerves, including those nerves that control movement, pain, and normal sensation. Following a nerve block, you may notice some bruising at the site where the block was given. You may experience sensations such as: numbness of the affected area or limb, tingling, heaviness (that is the limb feels heavy to you), weakness or inability to move the affected arm or leg, or a feeling as if your arm or leg has “fallen asleep.”     A nerve block can last from 2 to 36 hours depending on the medications used.  Usually the weakness wears off first followed by the tingling and heaviness. As the block wears off, you may begin to notice pain; however, this sequence of events may occur in any order. Typically, you will be able to move your limb before you will feel it. Once a nerve block begins to wear off, the effects are usually completely gone within 60 minutes.  If you experience continued side effects that you believe are block related for longer than 48 hours, please call your healthcare provider. Please see block-specific instructions below.    Instructions for any Block involving the leg/foot:   If you have had a leg /foot block, you should not bear weight on the affected leg until the block has worn off. After the block has worn off, weight bearing should be as directed by your surgeon. You may be sent home with crutches. You are at high risk for falling because of the anesthetic effects on your leg. Please use caution when standing or trying to move or walk. Have someone assist you until your leg and foot function have returned to normal.     Protection of a “blocked” limb  After a nerve block, you cannot feel pain, pressure, or extremes of temperature in the affected limb. And because of this, your blocked limb is at more risk for injury. For example, it is possible to burn your limb on an extremely hot surface without  feeling it.     When resting, it is important to reposition your limb periodically to avoid prolonged pressure on it. This may require the use of pillows and padding.    While sleeping, you should avoid rolling onto the affected limb or putting too much pressure on it.     If you have a cast or tight dressing, check the color of your fingers or toes of the affected limb. Call your surgeon if they look discolored (that is, dusky, dark colored).    Use caution in cold weather. Cover your limb appropriately to protect it from the cold.    Pain Management:  Your surgeon will give you a prescription for pain medication. Begin taking this before the nerve block wears off. Bear in mind that sometimes the block can wear off in the middle of the night.        *last pain pill at 9:27 AM*           NEXT DOSE OF PAIN MEDICINE YOU CAN HAVE AT 1:30 P.M.

## 2023-12-19 ENCOUNTER — TELEPHONE (OUTPATIENT)
Dept: ORTHOPEDIC SURGERY | Facility: CLINIC | Age: 68
End: 2023-12-19
Payer: COMMERCIAL

## 2023-12-19 NOTE — TELEPHONE ENCOUNTER
Caller: MIRIAM    Relationship to Patient: SPOUSE   Phone Number: [phone] (home)     Reason for Call:   PATIENTS  CALLING STATING THAT HE SPOKE WITH CARE TENDERS AND THEY INFORMED HIM THAT THEY COULD NOT SCHEDULE THE PATIENT WITHOUT A PAPER STATING THAT SHE WAS DISCHARGE FROM THE HOSPITAL

## 2023-12-19 NOTE — OP NOTE
Name: Oumou Jacinto    YOB: 1955    DATE OF SURGERY: 12/18/2023    PREOPERATIVE DIAGNOSIS: Left knee end-stage osteoarthritis    POSTOPERATIVE DIAGNOSIS: Left knee end-stage osteoarthritis    PROCEDURE PERFORMED: Left total knee replacement     SURGEON: Markus Addison M.D.    ASSISTANT: KAREL QIU    A surgical assistant was integral in ensuring a successful outcome with this procedure.  The assistant was utilized to assist in positioning the patient, draping the patient, was used throughout the case to provide with retraction of tissues, suctioning of blood and body fluids for visualization, positioning of the extremity to allow for proper exposure so that I could perform the procedure.  Without the use of a surgical assistant during this procedure I feel that the outcome may have been compromised or would have been suboptimal or at risk for complications.    IMPLANTS: [facility]:     Implant Name Type Inv. Item Serial No.  Lot No. LRB No. Used Action   CMT BONE PALACOS R HI/VISC 1X40 - [id number] Implant CMT BONE PALACOS R HI/VISC 1X40  [facility] [id number] Left 1 Implanted   DEV CONTRL TISS STRATAFIX SYMM PDS PLUS RADHA CT-1 60CM - [id number] Implant DEV CONTRL TISS STRATAFIX SYMM PDS PLUS RADHA CT-1 60CM  [employer]  [employer] [id number] Left 1 Implanted   DEV CONTRL TISS STRATAFIXSPIRALMNCRYL PLSPS2 REV3/0 45CM - [id number] Implant DEV CONTRL TISS STRATAFIXSPIRALMNCRYL PLSPS2 REV3/0 45CM  [employer]  DIV [employer] J [employer] J [id number] Left 1 Implanted   PAT GEN2 BICONVEX 30W61XJ - [id number] Implant PAT GEN2 BICONVEX [id number]  [employer] [id number] Left 1 Implanted   COMP FEM LEGION OXINIUM CR SZ6 LT - [id number] Implant COMP FEM LEGION OXINIUM CR SZ6 LT  [employer] [id number] Left 1 Implanted   BASE TIB/KN GEN2 NONPOR TI SZ5 LT - [id number] Implant BASE TIB/KN GEN2 NONPOR TI SZ5 LT  [employer] [id number] Left 1 Implanted   CMT BONE PALACOS R HI/VISC 1X40 - [id number]  Implant CMT BONE PALACOS R HI/VISC 1X40  [facility] [id number] Left 1 Implanted   INSRT ART/KN LEGION CR HF XLPE SZ5TO6 9MM - [id number] Implant INSRT ART/KN LEGION CR HF XLPE SZ5TO6 9MM  [employer] [id number] Left 1 Implanted       Estimated Blood Loss: 200cc  Specimens : none  Complications: none    DESCRIPTION OF PROCEDURE: The patient was taken to the operating room and placed in the supine position. A sequential compression device was carefully placed on the non-operative leg. Preoperative antibiotics were administered. Surgical time out was performed. After adequate induction of anesthesia, the leg was prepped and draped in the usual sterile fashion, exsanguinated with an Esmarch bandage and the tourniquet inflated to 250 mmHg. A midline incision was performed followed by a medial parapatellar arthrotomy. The patella was subluxed laterally.  A portion of the fat pad, ACL, and anterior horns of the meniscus were excised.  A drill hole was then placed in the center of the femoral canal in line with the canal.  It was irrigated and suctioned.  The intramedullary haley was then placed and a 5 degree distal valgus cut was performed after the block pinned in place appropriately.  Cut surface was then removed.  The sizing and rotation guide was then placed and seated appropriately.  It was sized and then the drill holes for the 4-in-1 cutting guide were placed in 3 degrees of external rotation based off of the posterior condyles.  The 4-in-1 cutting block was then placed and the femoral cuts were performed.  The excess bone was removed and the cut surfaces looked good.  At this point we placed the retractors around the proximal tibia and a slight release of the PCL fibers off of the posterior proximal tibia was performed.  We used the extramedullary tibial alignment guide and it was aligned appropriately and then the depth was set and the block pinned in place.  The tibial cut was then performed and the  alignment guide was removed.  The tibial cut was removed and the cut surface looked good.  The posterior horns of the menisci were then removed as well as the posterior osteophytes.  Flexion extension blocks were then used to check the balance of the knee. The tibial cut surface was then sized with the sizing templates and the tibial and femoral trial were then placed. The knee was placed in full extension and then the tibial tray rotation was then matched to the femoral rotation and marked.    Attention was then placed to the patella. The patella was noted to track centrally through range of motion. The patella was then sized with the trials. The thickness of the patella was then measured. The patella was resurfaced and the surrounding osteophytes were removed. The preoperative thickness was reproduced. The patella tracked centrally through range of motion.  We then checked the balance with the trial implants in place and there was excellent medial lateral and flexion-extension balance.     At this point all trial components were removed, the knee was copiously irrigated with pulsed lavage, and the knee was injected with anesthetic cocktail solution. The cut surfaces were then dried with clean lap sponges, and the components were cemented tibia, followed by femur, then patella. The knee was held in full extension and all excess cement was removed. The knee was held still until the cement had completely hardened. We then placed the trial polyethylene spacer which resulted in full extension and excellent flexion-extension balance. We placed the final polyethylene spacer.   The knee was then copiously irrigated. The tourniquet was then released. There was excellent hemostasis. We placed a one-eighth inch Hemovac drain. We closed the knee in multiple layers in standard fashion. Sterile dressing were applied. At the end of the case, the sponge and needle counts were reported as being correct. There were no known  complications. The patient was then transported to the recovery room.      Markus Addison M.D.

## 2023-12-19 NOTE — TELEPHONE ENCOUNTER
Call returned to both the patient and her .  They were concerned because they had not heard from home health yet.  They were told at the time of discharge that care tenders should be following him.  The  did contact care tenders earlier today and they told him that they were not aware of the patient and did not have any orders.  I did contact the discharge planner at the hospital but have not heard back from her yet I also spoke with care tenders.  Apparently they were not aware of the patient however they did get the orders this morning and they are planning to see the patient tomorrow

## 2023-12-20 ENCOUNTER — TELEPHONE (OUTPATIENT)
Dept: ORTHOPEDIC SURGERY | Facility: HOSPITAL | Age: 68
End: 2023-12-20
Payer: COMMERCIAL

## 2023-12-20 NOTE — TELEPHONE ENCOUNTER
Post op day 2  Discharge Instructions:  Ask patient about his or her discharge instructions  ?  Patient confirmed understanding   []  Further instruction needed   What, if any, recommendations, teaching, or interventions did you provide? Click or tap here to enter text.  Health status:  Pain controlled Yes   Pain seems to be controlled with the pain medication   incision/dressing status   ?  Clean without redness, drainage, odor  []  Redness    []  Drainage - color Click or tap here to enter text.  []  Odor  RICCO - Green light blinking Yes  Difficulties urination No  Last BM 12/20/2023 (if no BM by day 3-recommend OTC suppository or fleets enema)  No issue   Medications:  ?Medications reviewed with patient/family/caregiver  Patient taking medications as prescribed?   Yes  If not taking medications as prescribed, note specific medicine(s) and reason for each:  Click or tap here to enter text.  Hospital Follow Up Plan:  Follow up Appointment with Orthopedic surgeon:  ?Has f/u appointment                []Scheduled f/u appointment  Home Care ordered at discharge?    Yes        Home Care started, or contact made?    Yes   If no, action taken:   DME obtained/used in home?         Yes   Using IS  Choose an item.   Other information: Ms. Jacinto said she is doing ok. She seems out of sorts. She said it's a little better now that the anesthesia is out of her system. She was having hallucinations the first night. She does have some increased pain. But she is taking 1 pill every 4 hours and it helps. She continues to ice and elevate. PT has been out to see her. She is starting with the bike. She is walking and was able to do the steps. She asked if it was ok to sleep in the recliner as that is more comfortable to her. Told her that was fine and to prop her leg on a pillow. Spoke with her  at this time as well. We went over the dressing, icing, and elevating. They don't have any other questions for me at this time. He  was given my contact information should they need anything.

## 2023-12-21 ENCOUNTER — TELEPHONE (OUTPATIENT)
Dept: ORTHOPEDIC SURGERY | Facility: CLINIC | Age: 68
End: 2023-12-21

## 2023-12-21 NOTE — TELEPHONE ENCOUNTER
"Provider: DR YODER    Caller: LYNETTE RAPHAEL    Relationship to Patient: SELF    Phone Number: [phone]    Reason for Call: PT STATES THAT AFTER HER SURGERY WITH DR YODER HER ASTHMA HAS STARTED TO FLARE UP AND SHE IS HAVING EXTREME COUGHING FITS, COUGHING UP \"GREEN GUNK\" AND FEELING DIZZY AND \"LOOPY\". SHE IS CONCERNED AND NOT SURE WHAT TO DO.  "

## 2023-12-26 ENCOUNTER — TELEPHONE (OUTPATIENT)
Dept: ORTHOPEDIC SURGERY | Facility: CLINIC | Age: 68
End: 2023-12-26
Payer: COMMERCIAL

## 2023-12-26 DIAGNOSIS — Z96.652 S/P TKR (TOTAL KNEE REPLACEMENT), LEFT: ICD-10-CM

## 2023-12-26 RX ORDER — HYDROCODONE BITARTRATE AND ACETAMINOPHEN 5; 325 MG/1; MG/1
1 TABLET ORAL EVERY 4 HOURS PRN
Qty: 40 TABLET | Refills: 0 | Status: SHIPPED | OUTPATIENT
Start: 2023-12-26

## 2023-12-26 NOTE — TELEPHONE ENCOUNTER
Caller: LYNETTE RAPHAEL    Relationship to Patient: .PT      Phone Number: [phone]    Reason for Call: PATIENT IS ALSO REQUESTING CALL BACK FROM SHIRLEY QIU.

## 2023-12-26 NOTE — TELEPHONE ENCOUNTER
Last refill 12/18/23    Surgery 12/18/23    Last appointment 12/14/23    Future appointment 01/04/23    Patient would also like a telephone call from Roge

## 2023-12-26 NOTE — TELEPHONE ENCOUNTER
Caller: OrvilleCharan-YES  VERBAL     Relationship: Emergency Contact    Best call back number: [phone]    Requested Prescriptions:   Requested Prescriptions     Pending Prescriptions Disp Refills    HYDROcodone-acetaminophen (NORCO) 5-325 MG per tablet 56 tablet 0     Sig: Take 1-2 tablets by mouth Every 4 (Four) Hours As Needed.        Pharmacy where request should be sent: [facility]Y #[id number] - [facility], [place] - [address] AT [facility] [phone] [facility] [phone] FX     Last office visit with prescribing clinician: 6/27/2023   Last telemedicine visit with prescribing clinician: Visit date not found   Next office visit with prescribing clinician: Visit date not found     Additional details provided by patient:     Does the patient have less than a 3 day supply:  [x] Yes  [] No    Would you like a call back once the refill request has been completed: [x] Yes [] No    If the office needs to give you a call back, can they leave a voicemail: [x] Yes [] No    Alda Lombardi Rep   12/26/23 09:51 EST         DELETE AFTER READING TO PATIENT: “Thank you for sharing this information with me. I will send a message to the clinical team. Please allow 48 hours for the clinical staff to follow up on this request.”

## 2024-01-04 ENCOUNTER — OFFICE VISIT (OUTPATIENT)
Dept: ORTHOPEDIC SURGERY | Facility: CLINIC | Age: 69
End: 2024-01-04
Payer: COMMERCIAL

## 2024-01-04 VITALS — BODY MASS INDEX: 35.65 KG/M2 | WEIGHT: 249 LBS | TEMPERATURE: 97.8 F | HEIGHT: 70 IN

## 2024-01-04 DIAGNOSIS — Z96.652 S/P TKR (TOTAL KNEE REPLACEMENT), LEFT: ICD-10-CM

## 2024-01-04 PROCEDURE — 99024 POSTOP FOLLOW-UP VISIT: CPT | Performed by: NURSE PRACTITIONER

## 2024-01-04 RX ORDER — HYDROCODONE BITARTRATE AND ACETAMINOPHEN 5; 325 MG/1; MG/1
1 TABLET ORAL EVERY 4 HOURS PRN
Qty: 25 TABLET | Refills: 0 | Status: SHIPPED | OUTPATIENT
Start: 2024-01-04

## 2024-01-04 NOTE — PROGRESS NOTES
Oumou Jacinto : 1955 MRN: [id number] DATE: 2024    DIAGNOSIS: 2 week follow up left total knee      SUBJECTIVE:Patient returns today for 2 week follow up of left total knee replacement. Patient reports doing well with no unusual complaints. Appears to be progressing appropriately.  Patient reports that her pain level is still pretty moderate at this time.  States that they are transitioning to outpatient physical therapy on Monday and going to [facility].  Denies any instability or buckling of the knee.  Patient denies any signs or symptoms of infection, and they are without any other significant complaints today.    OBJECTIVE:   Exam:. The incision is healing appropriately. No sign of infection. Range of motion is progressing as expected. The calf is soft and nontender with a negative Homans sign.  Motor and sensory intact.  Neurovascular intact.    ASSESSMENT: 2 week status post left knee replacement.    PLAN: 1) Delroy and fusion dressings removed and steri strips applied   2) Order given for PT -outpatient   3) Instructed to Rest, Ice, use Compression, and Elevate   4)  aspirin 81 mg orally every day for 1 month   5) Follow up in 6 weeks with repeat Xrays of left knee (3views)    SHIRLEY Sosa  2024

## 2024-01-05 ENCOUNTER — TELEPHONE (OUTPATIENT)
Dept: ORTHOPEDIC SURGERY | Facility: CLINIC | Age: 69
End: 2024-01-05
Payer: COMMERCIAL

## 2024-01-05 NOTE — TELEPHONE ENCOUNTER
Caller: Oumou Jacinto    Relationship: Self    Best call back number: [phone] (home)       What is the best time to reach you: AS EARLY AS POSSIBLE    Who are you requesting to speak with (clinical staff, provider,  specific staff member): CLINICAL STAFF      What was the call regarding:   PATIENT HAD SX DEC 18 WITH DR YODER  PATIENT IS WANTING TO SEE IF THE DOCTOR CAN SIGN OFF FOR A HANDICAPP STICKER. PATIENT SAYS SHE HAS TO WALK QUITE A WAYS TO THE PT.       Is it okay if the provider responds through MyChart: NA

## 2024-01-08 ENCOUNTER — TREATMENT (OUTPATIENT)
Dept: PHYSICAL THERAPY | Facility: CLINIC | Age: 69
End: 2024-01-08
Payer: COMMERCIAL

## 2024-01-08 DIAGNOSIS — M25.569 POSTOPERATIVE PAIN, ACUTE, KNEE: ICD-10-CM

## 2024-01-08 DIAGNOSIS — R29.898 LEFT LEG WEAKNESS: ICD-10-CM

## 2024-01-08 DIAGNOSIS — G89.18 POSTOPERATIVE PAIN, ACUTE, KNEE: ICD-10-CM

## 2024-01-08 DIAGNOSIS — M25.662 KNEE STIFFNESS, LEFT: ICD-10-CM

## 2024-01-08 DIAGNOSIS — Z96.652 S/P TOTAL KNEE REPLACEMENT, LEFT: Primary | ICD-10-CM

## 2024-01-08 PROCEDURE — 97161 PT EVAL LOW COMPLEX 20 MIN: CPT | Performed by: PHYSICAL THERAPIST

## 2024-01-08 PROCEDURE — 97110 THERAPEUTIC EXERCISES: CPT | Performed by: PHYSICAL THERAPIST

## 2024-01-08 NOTE — PROGRESS NOTES
Physical Therapy Initial Evaluation and Plan of Care    [facility] [place] [address]  [phone] (phone)  [phone] (fax)      Patient: Oumou Jacinto   : 1955  Diagnosis/ICD-10 Code:  S/P total knee replacement, left [Z96.652]  Referring practitioner: SHIRLEY Sosa  Date of Initial Visit: 2024  Today's Date: 2024  Patient seen for 1 sessions    Visit Diagnoses:    ICD-10-CM ICD-9-CM   1. S/P total knee replacement, left  Z96.652 V43.65   2. Postoperative pain, acute, knee  G89.18 719.46    M25.569 338.18   3. Left leg weakness  R29.898 729.89   4. Knee stiffness, left  M25.662 719.56              Subjective Evaluation    History of Present Illness  Date of surgery: 2023 (L TKA)  Mechanism of injury: She had a reaction to the anesthesia for 1 week and a post-operative asthma attack and was prescribed steroids.   Her right shoulder has started to bother her using the cane.   She has had the 6Scan bike until tomorrow.   She had home health PT.      Pain  Current pain ratin  Location: Left knee    Social Support  Lives in: multiple-level home  Lives with: spouse    Patient Goals  Patient goals for therapy: increased strength, decreased pain, increased motion and independence with ADLs/IADLs             Objective          Observations     Additional Knee Observation Details  Steri strips left knee  Mild bruising left lower leg       Palpation     Additional Palpation Details  Mild warmth over left knee     Active Range of Motion   Left Knee   Flexion: 110 degrees   Extensor lag: 3 degrees     Strength/Myotome Testing     Left Knee   Flexion: 4-  Extension: 4-    Swelling     Left Knee Girth Measurement (cm)   Joint line: 50.5.    Right Knee Girth Measurement (cm)   Joint line: 49.5.    Ambulation   Weight-Bearing Status   Assistive device used: single point cane (May  cane heavily)    Observational Gait   Walking  speed comments: Slightly decreased    Additional Observational Gait Details  Very slightly antalgic without cane     Functional Assessment     Comments  Able to stand up from chair without hand support with some increased knee pain and effort  Able to step up and down 6 inch step with slight knee weakness          See Exercise, Manual, and Modality Logs for complete treatment.       Functional Outcome Score: KOS ADL score is 55% ability that is 45% disability         Assessment & Plan       Assessment  Impairments: abnormal gait, abnormal or restricted ROM, activity intolerance, impaired balance, impaired physical strength, lacks appropriate home exercise program, pain with function and weight-bearing intolerance   Functional limitations: walking, uncomfortable because of pain, sitting, standing and stooping   Assessment details: Oumou Jacinto is a 68 y.o. year-old female referred to physical therapy for left total knee performed on 12/18/23. She presents with a stable clinical presentation.  She has no known comorbidities or personal factors that may affect her progress in the plan of care.  Signs and symptoms are consistent with physical therapy diagnosis of post-operative knee pain, knee stiffness, knee weakness and impaired mobility. Overall she is doing quite well 3 weeks after knee surgery.   Prognosis: good    Goals  Plan Goals: STGs to be achieved by 02/19/24    STG 1 Patient will demonstrate an independent HEP progressed for knee strength and ROM/flexibility.    STG 2 Patient will demonstrate increased left knee AROM from 3-110 degrees to 0-120 degrees for improved functional mobility to improve getting shoes and socks on and getting in and out of car    STG 3 Patient will progress to short community walking without cane with normal gait     STG 4 Patient will increase left knee strength to 4/5 to negotiate stairs with light hand support on rail     LTGs to to achieved by 04/06/24    LTG 1 Patient will  stand up from chair without hand support with ease     LTG 2 Patient will transfer to and from floor with external support to assist her in order to play with her grandchildren     LTG 3 Patient will report decreased functional disability on KOS ADL score from 45 % to 20 % or less to improve her mobility with walking, stairs and squatting     Plan  Therapy options: will be seen for skilled therapy services  Planned therapy interventions: balance/weight-bearing training, gait training, home exercise program, therapeutic activities, stretching, strengthening, manual therapy and neuromuscular re-education  Frequency: 2x week  Duration in weeks: 12  Treatment plan discussed with: patient        Timed:         Manual Therapy:    0     mins  14813;     Therapeutic Exercise:    18     mins  02624;     Neuromuscular Park:    0    mins  25221;    Therapeutic Activity:     0     mins  31715;     Gait Trainin     mins  37834;     Ultrasound:     0     mins  32354;    Self Care                       0     mins   31447      Un-Timed:  Electrical Stimulation:    0     mins  11580 ( );  Dry Needling  (1-2 muscles)            0     mins 13109 (Self-pay)  Dry Needling (3-4 muscles) 0     mins 64368 (Self-pay)  Dry Needling Trial    0     mins DRYNDLTRIAL  (No Charge)  Traction     0     mins 36213  Low Eval     23     Mins  12001  Mod Eval     0     Mins  23207  High Eval                       0     Mins  71791    Timed Treatment:   18   mins   Total Treatment:     43   mins    PT SIGNATURE: YANETH [place] License Number: [id number]    Electronically signed by Harmony Ellis PT, 24, 11:09 AM EST    DATE TREATMENT INITIATED: 2024    Initial Certification  Certification Period: 2024  I certify that the therapy services are furnished while this patient is under my care.  The services outlined above are required by this patient, and will be reviewed every 90 days.     PHYSICIAN: Roge Estrada,  SHIRLEY   NPI: [id number]                                         DATE:     Please sign and return via fax to [phone] Thank you, [facility].

## 2024-01-10 ENCOUNTER — TREATMENT (OUTPATIENT)
Dept: PHYSICAL THERAPY | Facility: CLINIC | Age: 69
End: 2024-01-10
Payer: COMMERCIAL

## 2024-01-10 DIAGNOSIS — G89.18 POSTOPERATIVE PAIN, ACUTE, KNEE: ICD-10-CM

## 2024-01-10 DIAGNOSIS — M25.662 KNEE STIFFNESS, LEFT: ICD-10-CM

## 2024-01-10 DIAGNOSIS — R29.898 LEFT LEG WEAKNESS: ICD-10-CM

## 2024-01-10 DIAGNOSIS — Z96.652 S/P TOTAL KNEE REPLACEMENT, LEFT: Primary | ICD-10-CM

## 2024-01-10 DIAGNOSIS — M25.569 POSTOPERATIVE PAIN, ACUTE, KNEE: ICD-10-CM

## 2024-01-10 PROCEDURE — 97110 THERAPEUTIC EXERCISES: CPT | Performed by: PHYSICAL THERAPIST

## 2024-01-10 NOTE — PROGRESS NOTES
Physical Therapy Daily Treatment Note    [facility] [place]  [address] [place] [address]  [phone] (phone)  [phone] (fax)    Patient: Oumou Jacinto   : 1955  Diagnosis/ICD-10 Code:  S/P total knee replacement, left [Z96.652]  Referring practitioner: SHIRLEY Sosa  Today's Date: 1/10/2024  Patient seen for 2 sessions    Visit Diagnoses:    ICD-10-CM ICD-9-CM   1. S/P total knee replacement, left  Z96.652 V43.65   2. Postoperative pain, acute, knee  G89.18 719.46    M25.569 338.18   3. Left leg weakness  R29.898 729.89   4. Knee stiffness, left  M25.662 719.56              Subjective Oumou reports that she still has knee pain but no longer taking narcotic.  She is using Tylenol.  She feels some anxiety about not driving and her  going out of town.    Objective   See Exercise, Manual, and Modality Logs for complete treatment.   Suggest she try driving with her friend since she is not taking narcotic.  Ice her knee and her right shoulder.  Right shoulder pain with active abduction.  Adjusted her cane up 2 notches but did not use in clinic,    Assessment/Plan    Oumou is doing well with her left knee with expected pain and swelling.  She has had new onset of right shoulder pain since surgery to look at next week if still bothering her.          Timed:    Manual Therapy:    0     mins  09661;  Therapeutic Exercise:    40     mins  48557;     Neuromuscular Park:    3    mins  67090;    Therapeutic Activity:     0     mins  09350;     Gait Trainin     mins  77110;     Ultrasound:     0     mins  31711;    Aquatic Therapy    0     mins 99808;  Self Care                       0     mins   44676        Untimed:  Electrical Stimulation:    0     mins  52918 ( );  Traction:    0     mins  36048;   Dry Needling  (1-2 muscles)            0     mins  (Self-pay)  Dry Needling (3-4 muscles) 0      (Self-pay)  Dry Needling Trial    0      DRYNDLTRIAL  (No Charge)    Timed Treatment:   43   mins   Total Treatment:     43   mins    Harmony Ellis, PT  Physical Therapist    [place] License:[id number]

## 2024-01-15 ENCOUNTER — TELEPHONE (OUTPATIENT)
Dept: ORTHOPEDIC SURGERY | Facility: HOSPITAL | Age: 69
End: 2024-01-15
Payer: COMMERCIAL

## 2024-01-15 NOTE — TELEPHONE ENCOUNTER
"Called and spoke with Ms. Jacinto at this time to see how she has been doing since her LTK 12/18. She said she is doing great. She is working with PT and they said she is ahead of the game and doing amazing. She is almost at her goal for flexion and extension. She still has some swelling from time to time and uses the ice and it gets better. She has some nerve pain when she gets up that goes from the side of her leg and up into her hip. Once she moves a little bit it goes away. She feels weak and drained as well. She said the least little activity wears her out. She is still having trouble getting comfortable and sleeping. She sleeps in the chair some nights because she feels she gets better rest than tossing and turning in the bed. She said she knows most of this is in her head, and she really feels like she is doing great but was worried on whether or not this is normal. Explained that healing takes time, and everything she is experiencing is normal and it can take a few months to return to a \"normal\" state. She voiced understanding. Ms. Jacinto doesn't have any other questions for me at this time. She has my contact information should she need anything.   "

## 2024-01-18 ENCOUNTER — TREATMENT (OUTPATIENT)
Dept: PHYSICAL THERAPY | Facility: CLINIC | Age: 69
End: 2024-01-18
Payer: COMMERCIAL

## 2024-01-18 DIAGNOSIS — G89.18 POSTOPERATIVE PAIN, ACUTE, KNEE: ICD-10-CM

## 2024-01-18 DIAGNOSIS — M25.662 KNEE STIFFNESS, LEFT: ICD-10-CM

## 2024-01-18 DIAGNOSIS — Z96.652 S/P TOTAL KNEE REPLACEMENT, LEFT: Primary | ICD-10-CM

## 2024-01-18 DIAGNOSIS — M25.569 POSTOPERATIVE PAIN, ACUTE, KNEE: ICD-10-CM

## 2024-01-18 DIAGNOSIS — R29.898 LEFT LEG WEAKNESS: ICD-10-CM

## 2024-01-18 PROCEDURE — 97530 THERAPEUTIC ACTIVITIES: CPT | Performed by: PHYSICAL THERAPIST

## 2024-01-18 PROCEDURE — 97110 THERAPEUTIC EXERCISES: CPT | Performed by: PHYSICAL THERAPIST

## 2024-01-18 NOTE — PROGRESS NOTES
Physical Therapy Daily Treatment Note    [facility]  [address] [place] [address]  [phone] (phone)  [phone] (fax)    Patient: Oumou Jacinto   : 1955  Diagnosis/ICD-10 Code:  S/P total knee replacement, left [Z96.652]  Referring practitioner: SHIRLEY Sosa  Today's Date: 2024  Patient seen for 3 sessions    Visit Diagnoses:    ICD-10-CM ICD-9-CM   1. S/P total knee replacement, left  Z96.652 V43.65   2. Postoperative pain, acute, knee  G89.18 719.46    M25.569 338.18   3. Left leg weakness  R29.898 729.89   4. Knee stiffness, left  M25.662 719.56              Subjective Oumou reports that her knee is feeling better.  She did drive but was afraid to drive after it snowed.  Her right shoulder is still bothering her quite a bit.  Her daughter has to help her fasten her bra.    Objective          Observations   Left Knee   Positive for edema.       Tenderness     Right Shoulder  Tenderness in the acromion and subacromial bursa.     Active Range of Motion   Left Knee   Flexion: 115 degrees   Extension: 3 degrees     Additional Active Range of Motion Details  Pain reaching R shoulder behind her back more than out to the side more than overhead    Tests     Right Shoulder   Positive full can, Neer's and painful arc.     Ambulation     Observational Gait   Gait: within functional limits   Walking speed comments: Increased from initial    Functional Assessment     Comments  Able to stand up from chair without hand support with some left knee strain      See Exercise, Manual, and Modality Logs for complete treatment.   Suggest she use ice and or heat to right shoulder.  May try Voltaren cream.    Stretch out left knee to chair for 5 minutes to straighten knee.     Assessment/Plan     Increased left knee flexion by 5 degrees.  Extension still lacks 3 degrees and plan to stretch next visit. She is no longer using cane and has near-normal gait on  level ground. She can stand up from chair without using her hands with some knee discomfort.  Right shoulder screening shows pain-limiting ROM and strength.  Tenderness to anterior and posterior joint.  Suspect bursitis.      Harmony Ellis, PT       Timed:    Manual Therapy:    0     mins  03456;  Therapeutic Exercise:    42     mins  83505;     Neuromuscular Park:    0    mins  24382;    Therapeutic Activity:    8     mins  12825;     Gait Trainin     mins  99833;     Ultrasound:     0     mins  43454;    Aquatic Therapy    0     mins 28072;  Self Care                       0     mins   26691        Untimed:  Electrical Stimulation:    0     mins  41562 ( );  Traction:    0     mins  44704;   Dry Needling  (1-2 muscles)            0     mins 38626 (Self-pay)  Dry Needling (3-4 muscles) 0     39744 (Self-pay)  Dry Needling Trial    0     DRYNDLTRIAL  (No Charge)    Timed Treatment:   50   mins   Total Treatment:     50   mins    Harmony Ellis, PT  Physical Therapist    [place] License:[id number]

## 2024-01-23 ENCOUNTER — TREATMENT (OUTPATIENT)
Dept: PHYSICAL THERAPY | Facility: CLINIC | Age: 69
End: 2024-01-23
Payer: COMMERCIAL

## 2024-01-23 DIAGNOSIS — M25.662 KNEE STIFFNESS, LEFT: ICD-10-CM

## 2024-01-23 DIAGNOSIS — M25.569 POSTOPERATIVE PAIN, ACUTE, KNEE: ICD-10-CM

## 2024-01-23 DIAGNOSIS — G89.18 POSTOPERATIVE PAIN, ACUTE, KNEE: ICD-10-CM

## 2024-01-23 DIAGNOSIS — R29.898 LEFT LEG WEAKNESS: ICD-10-CM

## 2024-01-23 DIAGNOSIS — Z96.652 S/P TOTAL KNEE REPLACEMENT, LEFT: Primary | ICD-10-CM

## 2024-01-23 PROCEDURE — 97110 THERAPEUTIC EXERCISES: CPT | Performed by: PHYSICAL THERAPIST

## 2024-01-23 NOTE — PROGRESS NOTES
Physical Therapy Daily Treatment Note    [facility]  [address] [place] [address]  [phone] (phone)  [phone] (fax)    Patient: Oumou Jacinto   : 1955  Diagnosis/ICD-10 Code:  S/P total knee replacement, left [Z96.652]  Referring practitioner: SHIRLEY Sosa  Today's Date: 2024  Patient seen for 4 sessions    Visit Diagnoses:    ICD-10-CM ICD-9-CM   1. S/P total knee replacement, left  Z96.652 V43.65   2. Postoperative pain, acute, knee  G89.18 719.46    M25.569 338.18   3. Left leg weakness  R29.898 729.89   4. Knee stiffness, left  M25.662 719.56              Subjective Oumou reports that her knee is progressing well however she still has the most pain in her right shoulder with movement so that she cannot fasten her own bra.    She was able to put her shoes on and she was able to stand to cook dinner.     Objective   See Exercise, Manual, and Modality Logs for complete treatment.   Applied Kinesiotape to her right shoulder for reduce rounded shoulder posture and promote relaxation of the upper trapezius.   Instructed her in basic shoulder exercises for pendulums, scapular retraction and shoulder external rotation with yellow band.  Internal rotation with yellow band was pain-limiting today.  Suggest she may consider making ortho appointment for her shoulder.    Assessment/Plan    Oumou has some mild stiffness at end ROM left knee extension and flexion but overall knee ROM increasing nicely.  She has some mild warmth and swelling to her left knee and bruising is fading along the shin.  She is walking well without cane and gaining better endurance. Plan to work on stairs next visit.        Timed:    Manual Therapy:    1     mins  24385;  Therapeutic Exercise:    38     mins  88214;     Neuromuscular Park:    0    mins  71073;    Therapeutic Activity:     5     mins  84822;     Gait Trainin     mins  06245;     Ultrasound:     0      mins  84996;    Aquatic Therapy    0     mins 57686;  Self Care                       0     mins   94525        Untimed:  Electrical Stimulation:    0     mins  78157 ( );  Traction:    0     mins  66924;   Dry Needling  (1-2 muscles)            0     mins 20560 (Self-pay)  Dry Needling (3-4 muscles) 0     20561 (Self-pay)  Dry Needling Trial    0     DRYNDLTRIAL  (No Charge)    Timed Treatment:   43   mins   Total Treatment:     43   mins    Harmony Ellis PT  Physical Therapist    [place] License:[id number]

## 2024-01-25 ENCOUNTER — TREATMENT (OUTPATIENT)
Dept: PHYSICAL THERAPY | Facility: CLINIC | Age: 69
End: 2024-01-25
Payer: COMMERCIAL

## 2024-01-25 DIAGNOSIS — G89.18 POSTOPERATIVE PAIN, ACUTE, KNEE: ICD-10-CM

## 2024-01-25 DIAGNOSIS — Z96.652 S/P TOTAL KNEE REPLACEMENT, LEFT: Primary | ICD-10-CM

## 2024-01-25 DIAGNOSIS — M25.569 POSTOPERATIVE PAIN, ACUTE, KNEE: ICD-10-CM

## 2024-01-25 DIAGNOSIS — M25.662 KNEE STIFFNESS, LEFT: ICD-10-CM

## 2024-01-25 DIAGNOSIS — R29.898 LEFT LEG WEAKNESS: ICD-10-CM

## 2024-01-25 PROCEDURE — 97110 THERAPEUTIC EXERCISES: CPT | Performed by: PHYSICAL THERAPIST

## 2024-01-25 PROCEDURE — 97530 THERAPEUTIC ACTIVITIES: CPT | Performed by: PHYSICAL THERAPIST

## 2024-01-25 NOTE — PROGRESS NOTES
Physical Therapy Daily Treatment Note    [facility]  [address] [place] [address]  [phone] (phone)  [phone] (fax)    Patient: Oumou Jacinto   : 1955  Diagnosis/ICD-10 Code:  S/P total knee replacement, left [Z96.652]  Referring practitioner: SHIRLEY Sosa  Today's Date: 2024  Patient seen for 5 sessions    Visit Diagnoses:    ICD-10-CM ICD-9-CM   1. S/P total knee replacement, left  Z96.652 V43.65   2. Postoperative pain, acute, knee  G89.18 719.46    M25.569 338.18   3. Left leg weakness  R29.898 729.89   4. Knee stiffness, left  M25.662 719.56              Subjective  Oumou reports that she was initially tired when she arrived to therapy.  She reports that her oxygen saturation level was 94% when she left.     Objective          Active Range of Motion   Left Knee   Flexion: 117 degrees   Extension: 0 degrees       See Exercise, Manual, and Modality Logs for complete treatment.       Assessment/Plan    Oumou demonstrates increased shortness of breath walking one lap of track.  She agrees it would be beneficial to see pulmonologist regarding her asthma.  Plan to work on  bottom 4 stairs reciprocally next week.        Timed:    Manual Therapy:    0     mins  52567;  Therapeutic Exercise:    35     mins  45836;     Neuromuscular Park:    0    mins  52623;    Therapeutic Activity:     8     mins  46864;     Gait Trainin     mins  10696;     Ultrasound:     0     mins  13938;    Aquatic Therapy    0     mins 60058;  Self Care                       0     mins   61794        Untimed:  Electrical Stimulation:    0     mins  21043 ( );  Traction:    0     mins  55491;   Dry Needling  (1-2 muscles)            0     mins  (Self-pay)  Dry Needling (3-4 muscles) 0      (Self-pay)  Dry Needling Trial    0     DRYNDLTRIAL  (No Charge)    Timed Treatment:   43   mins   Total Treatment:     43   mins    Harmony Ellis,  PT  Physical Therapist    [place] License:[id number]

## 2024-01-29 NOTE — PROGRESS NOTES
Physical Therapy Daily Treatment Note    [facility]  [address] [place] [address]  [phone] (phone)  [phone] (fax)    Patient: Oumou Jacinto   : 1955  Diagnosis/ICD-10 Code:  S/P total knee replacement, left [Z96.652]  Referring practitioner: SHIRLEY Sosa  Today's Date: 2024  Patient seen for 6 sessions    Visit Diagnoses:    ICD-10-CM ICD-9-CM   1. S/P total knee replacement, left  Z96.652 V43.65   2. Postoperative pain, acute, knee  G89.18 719.46    M25.569 338.18   3. Left leg weakness  R29.898 729.89   4. Knee stiffness, left  M25.662 719.56              Subjective   Pt walked up the stairs this session and was in a rush getting into the building today, so she is out of breath. She states her knee is sore and swollen. She feels she has plateaued with her knee progress which has frustrated her. Finally, her back and her L shoulder are sore today.    Objective   See Exercise, Manual, and Modality Logs for complete treatment.       Assessment/Plan  Pt tolerated session well overall. She required multiple seated rest breaks due to SOA. Ended today's session with LTRs and hamstring stretches due to SOA and to address low back pain. Pt reported decreased pain during LTRs, but stated it came back after sitting up. Educated pt that progress is not always linear and that increased pain can occur with increasing activity levels. Continue PT POC.          Timed:    Manual Therapy:    0     mins  96695;  Therapeutic Exercise:    25     mins  63164;     Neuromuscular Park:    15    mins  47254;    Therapeutic Activity:     0     mins  50561;     Gait Trainin     mins  33861;     Ultrasound:     0     mins  63353;    Aquatic Therapy    0     mins 75681;  Self Care                       0     mins   67923        Untimed:  Electrical Stimulation:    0     mins  56186 ( );  Traction:    0     mins  51544;   Dry Needling  (1-2  muscles)            0     mins 20560 (Self-pay)  Dry Needling (3-4 muscles) 0     20561 (Self-pay)  Dry Needling Trial    0     DRYNDLTRIAL  (No Charge)    Timed Treatment:   40   mins   Total Treatment:     40   mins    Stephenie Jean PT  Physical Therapist    [place] License:[id number]

## 2024-01-30 ENCOUNTER — TREATMENT (OUTPATIENT)
Dept: PHYSICAL THERAPY | Facility: CLINIC | Age: 69
End: 2024-01-30
Payer: COMMERCIAL

## 2024-01-30 DIAGNOSIS — R29.898 LEFT LEG WEAKNESS: ICD-10-CM

## 2024-01-30 DIAGNOSIS — Z96.652 S/P TOTAL KNEE REPLACEMENT, LEFT: Primary | ICD-10-CM

## 2024-01-30 DIAGNOSIS — M25.569 POSTOPERATIVE PAIN, ACUTE, KNEE: ICD-10-CM

## 2024-01-30 DIAGNOSIS — G89.18 POSTOPERATIVE PAIN, ACUTE, KNEE: ICD-10-CM

## 2024-01-30 DIAGNOSIS — M25.662 KNEE STIFFNESS, LEFT: ICD-10-CM

## 2024-02-02 ENCOUNTER — TREATMENT (OUTPATIENT)
Dept: PHYSICAL THERAPY | Facility: CLINIC | Age: 69
End: 2024-02-02
Payer: COMMERCIAL

## 2024-02-02 DIAGNOSIS — Z96.652 S/P TOTAL KNEE REPLACEMENT, LEFT: Primary | ICD-10-CM

## 2024-02-02 DIAGNOSIS — M25.569 POSTOPERATIVE PAIN, ACUTE, KNEE: ICD-10-CM

## 2024-02-02 DIAGNOSIS — G89.18 POSTOPERATIVE PAIN, ACUTE, KNEE: ICD-10-CM

## 2024-02-02 DIAGNOSIS — R29.898 LEFT LEG WEAKNESS: ICD-10-CM

## 2024-02-02 PROCEDURE — 97530 THERAPEUTIC ACTIVITIES: CPT | Performed by: PHYSICAL THERAPIST

## 2024-02-02 PROCEDURE — 97110 THERAPEUTIC EXERCISES: CPT | Performed by: PHYSICAL THERAPIST

## 2024-02-02 NOTE — PROGRESS NOTES
Physical Therapy Daily Treatment Note    [facility]  [address] [place] [address]  [phone] (phone)  [phone] (fax)    Patient: Oumou Jacinto   : 1955  Diagnosis/ICD-10 Code:  S/P total knee replacement, left [Z96.652]  Referring practitioner: SHIRLEY Sosa  Today's Date: 2024  Patient seen for 7 sessions    Visit Diagnoses:    ICD-10-CM ICD-9-CM   1. S/P total knee replacement, left  Z96.652 V43.65   2. Postoperative pain, acute, knee  G89.18 719.46    M25.569 338.18   3. Left leg weakness  R29.898 729.89              Subjective Oumou reports that the knee has not been swelling as much until this morning. She reports that it is easier going down than up stairs.     Objective          Active Range of Motion   Left Knee   Flexion: 117 degrees   Extension: 0 degrees       See Exercise, Manual, and Modality Logs for complete treatment.   Recommend she try Voltaren gel for right shoulder pain  Start walking to the mailbox.    Assessment/Plan    Oumou demonstrates ease going down stairs reciprocally but has some pain and weakness going up stairs.  She is not as short of breath walking one lap of track.  Overall she is doing quite well with her knee.      Timed:    Manual Therapy:    0     mins  41711;  Therapeutic Exercise:    30    mins  33711;     Neuromuscular Park:    3    mins  87622;    Therapeutic Activity:     10   mins  66985;     Gait Trainin     mins  90363;     Ultrasound:     0     mins  74263;    Aquatic Therapy    0     mins 30772;  Self Care                       0     mins   06422        Untimed:  Electrical Stimulation:    0     mins  05805 ( );  Traction:    0     mins  97258;   Dry Needling  (1-2 muscles)            0     mins  (Self-pay)  Dry Needling (3-4 muscles) 0      (Self-pay)  Dry Needling Trial    0     DRYNDLTRIAL  (No Charge)    Timed Treatment:   43   mins   Total Treatment:     43    mins    Harmony Ellis, PT  Physical Therapist    [place] License:[id number]

## 2024-02-06 ENCOUNTER — TREATMENT (OUTPATIENT)
Dept: PHYSICAL THERAPY | Facility: CLINIC | Age: 69
End: 2024-02-06
Payer: COMMERCIAL

## 2024-02-06 DIAGNOSIS — G89.18 POSTOPERATIVE PAIN, ACUTE, KNEE: ICD-10-CM

## 2024-02-06 DIAGNOSIS — M25.662 KNEE STIFFNESS, LEFT: ICD-10-CM

## 2024-02-06 DIAGNOSIS — Z96.652 S/P TOTAL KNEE REPLACEMENT, LEFT: Primary | ICD-10-CM

## 2024-02-06 DIAGNOSIS — M25.569 POSTOPERATIVE PAIN, ACUTE, KNEE: ICD-10-CM

## 2024-02-06 DIAGNOSIS — R29.898 LEFT LEG WEAKNESS: ICD-10-CM

## 2024-02-06 PROCEDURE — 97530 THERAPEUTIC ACTIVITIES: CPT | Performed by: PHYSICAL THERAPIST

## 2024-02-06 PROCEDURE — 97110 THERAPEUTIC EXERCISES: CPT | Performed by: PHYSICAL THERAPIST

## 2024-02-06 NOTE — PROGRESS NOTES
Physical Therapy Daily Treatment Note    [facility]  [address] [place] [address]  [phone] (phone)  [phone] (fax)    Patient: Oumou Jacinto   : 1955  Diagnosis/ICD-10 Code:  S/P total knee replacement, left [Z96.652]  Referring practitioner: SHIRLEY Sosa  Today's Date: 2024  Patient seen for 8 sessions    Visit Diagnoses:    ICD-10-CM ICD-9-CM   1. S/P total knee replacement, left  Z96.652 V43.65   2. Postoperative pain, acute, knee  G89.18 719.46    M25.569 338.18   3. Left leg weakness  R29.898 729.89   4. Knee stiffness, left  M25.662 719.56              Subjective Oumou reports that her knee is more sore today than it was over the weekend. She was able to walk to the mailbox.    Objective   See Exercise, Manual, and Modality Logs for complete treatment.       Assessment/Plan    Oumou demonstrates improved stair mobility with going up still more difficult than going down.  Continue to progress hip and knee strength.       Timed:    Manual Therapy:    0     mins  63791;  Therapeutic Exercise:    25     mins  48024;     Neuromuscular Park:    0    mins  91115;    Therapeutic Activity:     10     mins  41319;     Gait Trainin     mins  21560;     Ultrasound:     0     mins  70300;    Aquatic Therapy    0     mins 23827;  Self Care                       0     mins   77854        Untimed:  Electrical Stimulation:    0     mins  63194 ( );  Traction:    0     mins  76939;   Dry Needling  (1-2 muscles)            0     mins  (Self-pay)  Dry Needling (3-4 muscles) 0      (Self-pay)  Dry Needling Trial    0     DRYNDLTRIAL  (No Charge)    Timed Treatment:   35   mins   Total Treatment:     35   mins    Harmony Ellis, PT  Physical Therapist    [place] License:[id number]

## 2024-02-09 ENCOUNTER — TREATMENT (OUTPATIENT)
Dept: PHYSICAL THERAPY | Facility: CLINIC | Age: 69
End: 2024-02-09
Payer: COMMERCIAL

## 2024-02-09 DIAGNOSIS — R29.898 LEFT LEG WEAKNESS: ICD-10-CM

## 2024-02-09 DIAGNOSIS — Z96.652 S/P TOTAL KNEE REPLACEMENT, LEFT: Primary | ICD-10-CM

## 2024-02-09 DIAGNOSIS — M25.569 POSTOPERATIVE PAIN, ACUTE, KNEE: ICD-10-CM

## 2024-02-09 DIAGNOSIS — M25.662 KNEE STIFFNESS, LEFT: ICD-10-CM

## 2024-02-09 DIAGNOSIS — G89.18 POSTOPERATIVE PAIN, ACUTE, KNEE: ICD-10-CM

## 2024-02-09 PROCEDURE — 97110 THERAPEUTIC EXERCISES: CPT | Performed by: PHYSICAL THERAPIST

## 2024-02-09 NOTE — PROGRESS NOTES
30-Day / 10-Visit Progress Note       [facility]  [address] [place] [address]  [phone] (phone)  [phone] (fax)    Patient: Oumou Jacinto   : 1955  Diagnosis/ICD-10 Code:  S/P total knee replacement, left [Z96.652]  Referring practitioner: SHIRLEY Sosa  Date of Initial Visit: Type: THERAPY  Noted: 2024  Today's Date: 2024  Patient seen for 9 sessions      ICD-10-CM ICD-9-CM   1. S/P total knee replacement, left  Z96.652 V43.65   2. Postoperative pain, acute, knee  G89.18 719.46    M25.569 338.18   3. Left leg weakness  R29.898 729.89   4. Knee stiffness, left  M25.662 719.56         Subjective:     Clinical Progress: improved  Home Program Compliance: Yes  Treatment has included:  therapeutic exercise, manual therapy, therapeutic activity, and patient education with home exercise program     Subjective Oumou reports that her joints feel more sore and stiff today.  She had felt better earlier in the week. She has been trying to practice going up and down stairs more at home.  Objective          Active Range of Motion   Left Knee   Flexion: 120 degrees   Extension: 2 degrees     Strength/Myotome Testing     Left Knee   Flexion: 4  Extension: 4    Ambulation   Weight-Bearing Status   Ambulates (ft): Able to walk 1/12 mile on facility track with some decreased aerobic capactity.  Assistive device used: none    Ambulation: Stairs   Ascend stairs: independent  Pattern: reciprocal  Railings: one rail  Descend stairs: independent (Increased knee pain going down)  Pattern: reciprocal  Railings: one rail    Observational Gait   Gait: within functional limits     Functional Assessment     Comments  Able to stand up from chair without hand support        See Exercise, Manual, and Modality Logs for complete treatment.   Back off stairs allowing knee to recover     Assessment & Plan       Assessment  Impairments: abnormal gait, abnormal or  restricted ROM, activity intolerance, impaired balance, impaired physical strength, lacks appropriate home exercise program, pain with function and weight-bearing intolerance   Functional limitations: walking, uncomfortable because of pain, sitting, standing and stooping   Assessment details: Oumou Jacinto is a 68 y.o. year-old female referred to physical therapy for left total knee performed on 12/18/23. She has been seen for 9 sessions and she is progressing well.  She does have some increased knee pain today and she has experienced some post-operative right shoulder pain.  She demonstrates increased knee ROM and demonstrates improved functional mobility for walking and getting out of chair.  Stairs are still somewhat painful.  She does lack good aerobic conditioning with walking.   Prognosis: good    Goals  Plan Goals: STGs to be achieved by 02/19/24    STG 1 Patient will demonstrate an independent HEP progressed for knee strength and ROM/flexibility.  Met  STG 2 Patient will demonstrate increased left knee AROM from 3-110 degrees to 0-120 degrees for improved functional mobility to improve getting shoes and socks on and getting in and out of car  Partially met 2-120 degrees  STG 3 Patient will progress to short community walking without cane with normal gait   Partially met walking length of driveway and 1/12 lap on track  STG 4 Patient will increase left knee strength to 4/5 to negotiate stairs with light hand support on rail   Partially met able to perform with pain    LTGs to to achieved by 04/06/24    LTG 1 Patient will stand up from chair without hand support with ease   Met  LTG 2 Patient will transfer to and from floor with external support to assist her in order to play with her grandchildren   Ongoing   LTG 3 Patient will report decreased functional disability on KOS ADL score from 45 % to 20 % or less to improve her mobility with walking, stairs and squatting   Ongoing     Plan  Therapy options:  will be seen for skilled therapy services  Planned therapy interventions: balance/weight-bearing training, gait training, home exercise program, therapeutic activities, stretching, strengthening, manual therapy and neuromuscular re-education  Frequency: 2x week  Duration in weeks: 12  Treatment plan discussed with: patient           Recommendations: Continue as planned  Timeframe: 2 months  Prognosis to achieve goals: good    PT Signature: YANETH Durham License Number: [id number]    Electronically signed by Harmony Ellis PT, 24, 3:17 PM EST      Based upon review of the patient's progress and continued therapy plan, it is my medical opinion that Oumou Jacinto should continue physical therapy treatment at [facility]  [address] [place] [address] [phone].    Signature: __________________________________  Roge Estrada APRN    Timed:  Manual Therapy:    2     mins  85786;  Therapeutic Exercise:    40     mins  85268;     Neuromuscular Park:    0    mins  29040;    Therapeutic Activity:     0     mins  34848;     Gait Trainin     mins  88247;     Ultrasound:     0     mins  45903;      Untimed:  Electrical Stimulation:    0     mins  75286 (MC );  Traction:    0     mins  67731;   Dry Needling  (1-2 muscles)            0     mins 99801 (Self-pay)  Dry Needling (3-4 muscles) 0     mins 40862 (Self-pay)  Dry Needling Trial    0     mins DRYNDLTRIAL  (No Charge)      Timed Treatment:   42   mins   Total Treatment:     42   mins

## 2024-02-13 ENCOUNTER — TREATMENT (OUTPATIENT)
Dept: PHYSICAL THERAPY | Facility: CLINIC | Age: 69
End: 2024-02-13
Payer: COMMERCIAL

## 2024-02-13 DIAGNOSIS — R29.898 LEFT LEG WEAKNESS: ICD-10-CM

## 2024-02-13 DIAGNOSIS — M25.662 KNEE STIFFNESS, LEFT: ICD-10-CM

## 2024-02-13 DIAGNOSIS — Z96.652 S/P TOTAL KNEE REPLACEMENT, LEFT: Primary | ICD-10-CM

## 2024-02-13 DIAGNOSIS — M25.569 POSTOPERATIVE PAIN, ACUTE, KNEE: ICD-10-CM

## 2024-02-13 DIAGNOSIS — G89.18 POSTOPERATIVE PAIN, ACUTE, KNEE: ICD-10-CM

## 2024-02-13 PROCEDURE — 97530 THERAPEUTIC ACTIVITIES: CPT | Performed by: PHYSICAL THERAPIST

## 2024-02-13 PROCEDURE — 97110 THERAPEUTIC EXERCISES: CPT | Performed by: PHYSICAL THERAPIST

## 2024-02-16 ENCOUNTER — TREATMENT (OUTPATIENT)
Dept: PHYSICAL THERAPY | Facility: CLINIC | Age: 69
End: 2024-02-16
Payer: COMMERCIAL

## 2024-02-16 DIAGNOSIS — G89.18 POSTOPERATIVE PAIN, ACUTE, KNEE: ICD-10-CM

## 2024-02-16 DIAGNOSIS — M25.662 KNEE STIFFNESS, LEFT: ICD-10-CM

## 2024-02-16 DIAGNOSIS — Z96.652 S/P TOTAL KNEE REPLACEMENT, LEFT: Primary | ICD-10-CM

## 2024-02-16 DIAGNOSIS — R29.898 LEFT LEG WEAKNESS: ICD-10-CM

## 2024-02-16 DIAGNOSIS — M25.569 POSTOPERATIVE PAIN, ACUTE, KNEE: ICD-10-CM

## 2024-02-16 PROCEDURE — 97110 THERAPEUTIC EXERCISES: CPT | Performed by: PHYSICAL THERAPIST

## 2024-02-16 PROCEDURE — 97530 THERAPEUTIC ACTIVITIES: CPT | Performed by: PHYSICAL THERAPIST

## 2024-02-20 ENCOUNTER — OFFICE VISIT (OUTPATIENT)
Dept: ORTHOPEDIC SURGERY | Facility: CLINIC | Age: 69
End: 2024-02-20
Payer: COMMERCIAL

## 2024-02-20 VITALS — BODY MASS INDEX: 35.45 KG/M2 | WEIGHT: 247.6 LBS | HEIGHT: 70 IN | TEMPERATURE: 96.9 F

## 2024-02-20 DIAGNOSIS — Z96.652 STATUS POST LEFT KNEE REPLACEMENT: ICD-10-CM

## 2024-02-20 DIAGNOSIS — R52 PAIN: Primary | ICD-10-CM

## 2024-02-20 PROCEDURE — 73562 X-RAY EXAM OF KNEE 3: CPT | Performed by: ORTHOPAEDIC SURGERY

## 2024-02-20 PROCEDURE — 99024 POSTOP FOLLOW-UP VISIT: CPT | Performed by: ORTHOPAEDIC SURGERY

## 2024-02-20 NOTE — PROGRESS NOTES
Oumou Jacinto : 1955 MRN: [id number] DATE: 2024    DIAGNOSIS: 8 week follow up left total knee      SUBJECTIVE:Patient returns today for 8 week follow up of left total knee replacement. Patient reports doing well with no unusual complaints. Appears to be progressing appropriately.    OBJECTIVE:   Exam:. The incision is well healed. No sign of infection. Range of motion is measured at 0 to 125. The calf is soft and nontender with a negative Homans sign. Strength is progressing and the patient is ambulating appropriately.    DIAGNOSTIC STUDIES  Xrays: 3 views of the left knee (AP, lateral, and sunrise) were ordered and reviewed for evaluation of recent knee replacement. They demonstrate a well positioned, well aligned knee replacement without complicating factors noted. In comparison with previous films there has been no change.    ASSESSMENT: 8 week status post left knee replacement.    PLAN: 1) Continue with PT exercises as prescribed   2) Follow up in 10 months    Markus Addison MD  2024

## 2024-03-07 NOTE — PROGRESS NOTES
New Shoulder      Patient: Oumou Jacinto        YOB: 1955    Medical Record Number: [id number]        Chief Complaints:   Right shoulder pain    History of Present Illness: This is a 68-year-old right-hand-dominant female presents complaining of right shoulder pain she states she had a total knee replacement in December she was using her arms a lot for getting up out of a chair and walking with a walker she thinks that might of irritated she does not recall a particular event.  She is done well with her knee replacement symptoms on the shoulder are moderate intermittent aching worse with activity somewhat better with rest her past medical history as listed below      Allergies:   Allergies   Allergen Reactions    Penicillins Swelling and Rash     OF TONGUE       Medications:   Home Medications:  Current Outpatient Medications on File Prior to Visit   Medication Sig    albuterol sulfate  (90 Base) MCG/ACT inhaler Inhale 2 puffs Every 4 (Four) Hours As Needed for Wheezing.    clonazePAM (KlonoPIN) 0.5 MG tablet TAKE 1 TABLET BY MOUTH FOUR TIMES A DAY AS NEEDED ANXIETY    levalbuterol (XOPENEX) 0.63 MG/3ML nebulizer solution 1 UNIT INHALE THREE TIMES A DAY, AS NEEDED FOR SHORTNESS OF BREATH, VIA NEBULIZER.    montelukast (SINGULAIR) 10 MG tablet Take 1 tablet by mouth Every Night.    nadolol (CORGARD) 20 MG tablet Take 1 tablet by mouth Daily.    PARoxetine (PAXIL) 20 MG tablet Take 2 tablets by mouth Daily.    VITAMIN D PO Take  by mouth.    HYDROcodone-acetaminophen (NORCO) 5-325 MG per tablet Take 1 tablet by mouth Every 4 (Four) Hours As Needed for Moderate Pain.    ondansetron (Zofran) 4 MG tablet Take 1 tablet by mouth Every 8 (Eight) Hours As Needed for Nausea or Vomiting for up to 10 doses.    [DISCONTINUED] meloxicam (MOBIC) 15 MG tablet Take 1 tablet by mouth Daily. (Patient not taking: Reported on 2/20/2024)     No current facility-administered medications on file prior to visit.  "    Current Medications:  Scheduled Meds:  Continuous Infusions:No current facility-administered medications for this visit.    PRN Meds:.    Past Medical History:   Diagnosis Date    Anxiety     Arthritis     Asthma     Hypertension     Knee swelling Left knee    Left knee pain     Pemphigus vulgaris         Past Surgical History:   Procedure Laterality Date    LAPAROSCOPIC CHOLECYSTECTOMY      TOTAL KNEE ARTHROPLASTY Left 12/18/2023    Procedure: TOTAL KNEE ARTHROPLASTY;  Surgeon: Markus Addison MD;  [facility]: [facility];  Service: Orthopedics;  Laterality: Left;    TRIGGER POINT INJECTION  Cortisone shots in my left knee        Social History     Occupational History    Not on file   Tobacco Use    Smoking status: Never     Passive exposure: Never    Smokeless tobacco: Never   Vaping Use    Vaping status: Never Used   Substance and Sexual Activity    Alcohol use: Not Currently     Comment: quit 10-15 years ago    Drug use: Never    Sexual activity: Not Currently     Partners: Male     Birth control/protection: Post-menopausal      Social History     Social History Narrative    Not on file        Family History   Problem Relation Age of Onset    Malig Hyperthermia Neg Hx              Review of Systems:     Review of Systems      Physical Exam: 68 y.o. female  General Appearance:    Alert, cooperative, in no acute distress                   Vitals:    03/11/24 1414   Temp: 98.4 °F (36.9 °C)   Weight: 112 kg (246 lb)   Height: 177.8 cm (70\")   PainSc:   5      Patient is alert and read ×3 no acute distress appears her above-listed at height weight and age.  Affect is normal respiratory rate is normal unlabored. Heart rate regular rate rhythm, sclera, dentition and hearing are normal for the purpose of this exam.    Ortho Exam  Physical exam of the right shoulder reveals no overlying skin changes no lymphedema no lymphadenopathy.  Patient has active flexion 180 with mild symptoms abduction is similar external " rotation is to 50 and internal rotation to the upper lumbar spine with mild symptoms.  Patient has good rotator cuff strength 4+ over 5 with isometric strength testing with pain.  Patient has a positive impingement and a positive Ulloa sign.  Patient has good cervical range of motion which is full and asymptomatic no radicular symptoms.  Patient has a normal elbow exam.  Good distal pulses are present  Patient has pain with overhead activity and a positive Neer sign and a positive empty can sign , a positive drop arm and a definitive painful arc   Large Joint Arthrocentesis: R subacromial bursa  Date/Time: 3/11/2024 2:32 PM  Consent given by: patient  Site marked: site marked  Timeout: Immediately prior to procedure a time out was called to verify the correct patient, procedure, equipment, support staff and site/side marked as required   Supporting Documentation  Indications: pain   Procedure Details  Location: shoulder - R subacromial bursa  Preparation: Patient was prepped and draped in the usual sterile fashion  Needle gauge: 21G.  Approach: posterior  Medications administered: 80 mg methylPREDNISolone acetate 80 MG/ML; 2 mL lidocaine PF 1% 1 %  Patient tolerance: patient tolerated the procedure well with no immediate complications            Radiology:   AP, Scapular Y and Axillary Lateral of the right shoulder were ordered/reviewed to evauate shoulder pain.  I have no comparative films she has some acromioclavicular arthritis otherwise no acute bony pathology there is 1 area on her right middle lobe that is a little bit concerning I will send a picture of this to the radiologist and get their opinion  Imaging Results (Most Recent)       Procedure Component Value Units Date/Time    XR Shoulder 2+ View Right [[id number]] Resulted: 03/11/24 1410     Updated: 03/11/24 1410    Impression:      Ordering physician's impression is located in the Encounter Note dated 03/11/24. X-ray performed in the DR room.             Assessment/Plan: Right shoulder pain I think this is truly impingement plan is to proceed with an injection as a diagnostic and therapeutic tool I will start on some Aloxi chemistry precautions for short period of time and have her start into physical therapy should she fail this we will pursue other means of testing.  As mentioned above I will check with the radiologist about this chest finding we cannot find a recent chest x-ray to compare to therefore we will get a CT of the chest with contrast  Cortisone Injection. See procedure note.  Cortisone Injection for DIAGNOSTIC and THERAPUTIC purposes.  Prescription medication given with instructions, warnings, and precautions.

## 2024-03-11 ENCOUNTER — OFFICE VISIT (OUTPATIENT)
Dept: ORTHOPEDIC SURGERY | Facility: CLINIC | Age: 69
End: 2024-03-11
Payer: COMMERCIAL

## 2024-03-11 VITALS — BODY MASS INDEX: 35.22 KG/M2 | WEIGHT: 246 LBS | HEIGHT: 70 IN | TEMPERATURE: 98.4 F

## 2024-03-11 DIAGNOSIS — M75.41 IMPINGEMENT SYNDROME OF RIGHT SHOULDER: ICD-10-CM

## 2024-03-11 DIAGNOSIS — R52 PAIN: Primary | ICD-10-CM

## 2024-03-11 DIAGNOSIS — R93.89 ABNORMAL X-RAY: ICD-10-CM

## 2024-03-11 RX ORDER — MELOXICAM 15 MG/1
TABLET ORAL
Qty: 30 TABLET | Refills: 0 | Status: SHIPPED | OUTPATIENT
Start: 2024-03-11

## 2024-03-11 RX ADMIN — LIDOCAINE HYDROCHLORIDE 2 ML: 10 INJECTION, SOLUTION EPIDURAL; INFILTRATION; INTRACAUDAL; PERINEURAL at 14:32

## 2024-03-11 RX ADMIN — METHYLPREDNISOLONE ACETATE 80 MG: 80 INJECTION, SUSPENSION INTRA-ARTICULAR; INTRALESIONAL; INTRAMUSCULAR; SOFT TISSUE at 14:32

## 2024-03-12 ENCOUNTER — TELEPHONE (OUTPATIENT)
Dept: ORTHOPEDIC SURGERY | Facility: CLINIC | Age: 69
End: 2024-03-12
Payer: COMMERCIAL

## 2024-03-12 RX ORDER — LIDOCAINE HYDROCHLORIDE 10 MG/ML
2 INJECTION, SOLUTION EPIDURAL; INFILTRATION; INTRACAUDAL; PERINEURAL
Status: COMPLETED | OUTPATIENT
Start: 2024-03-11 | End: 2024-03-11

## 2024-03-12 RX ORDER — METHYLPREDNISOLONE ACETATE 80 MG/ML
80 INJECTION, SUSPENSION INTRA-ARTICULAR; INTRALESIONAL; INTRAMUSCULAR; SOFT TISSUE
Status: COMPLETED | OUTPATIENT
Start: 2024-03-11 | End: 2024-03-11

## 2024-03-26 DIAGNOSIS — Z96.652 S/P TKR (TOTAL KNEE REPLACEMENT), LEFT: Primary | ICD-10-CM

## 2024-03-26 RX ORDER — CLINDAMYCIN HYDROCHLORIDE 300 MG/1
CAPSULE ORAL
Qty: 2 CAPSULE | Refills: 0 | Status: SHIPPED | OUTPATIENT
Start: 2024-03-26

## 2024-03-26 NOTE — TELEPHONE ENCOUNTER
Provider: BROWN    Caller: PATIENT    Pharmacy: [facility] [phone]    Phone Number: [phone]    Reason for Call: PATIENT STATES SHE HAS A DENTAL APPT ON 04/20/24, AND SHE IS ASKING IF AN ANTIBIOTIC CAN BE SENT IN FOR THIS APPT. SHE STATES SHE IS ALLERGIC TO PENICILLIN.

## 2024-04-03 ENCOUNTER — HOSPITAL ENCOUNTER (OUTPATIENT)
Facility: HOSPITAL | Age: 69
Discharge: HOME | End: 2024-04-03
Admitting: ORTHOPAEDIC SURGERY
Payer: COMMERCIAL

## 2024-04-03 DIAGNOSIS — R93.89 ABNORMAL X-RAY: ICD-10-CM

## 2024-04-03 PROCEDURE — 71260 CT THORAX DX C+: CPT

## 2024-04-03 PROCEDURE — 25510000001 IOPAMIDOL 61 % SOLUTION: Performed by: ORTHOPAEDIC SURGERY

## 2024-04-03 RX ADMIN — IOPAMIDOL 100 ML: 612 INJECTION, SOLUTION INTRAVENOUS at 18:54

## 2024-04-08 RX ORDER — MELOXICAM 15 MG/1
TABLET ORAL
Qty: 30 TABLET | Refills: 0 | Status: SHIPPED | OUTPATIENT
Start: 2024-04-08

## 2024-04-08 NOTE — TELEPHONE ENCOUNTER
Last refill 03/11/24    Surgery 12/18/23    Last appointment 03/11/24    Future appointment 04/15/24

## 2024-04-15 ENCOUNTER — OFFICE VISIT (OUTPATIENT)
Dept: ORTHOPEDIC SURGERY | Facility: CLINIC | Age: 69
End: 2024-04-15
Payer: COMMERCIAL

## 2024-04-15 VITALS — BODY MASS INDEX: 34.79 KG/M2 | HEIGHT: 70 IN | WEIGHT: 243 LBS | TEMPERATURE: 98.2 F

## 2024-04-15 DIAGNOSIS — M75.41 IMPINGEMENT SYNDROME OF RIGHT SHOULDER: Primary | ICD-10-CM

## 2024-04-15 PROCEDURE — 99213 OFFICE O/P EST LOW 20 MIN: CPT | Performed by: ORTHOPAEDIC SURGERY

## 2024-04-15 NOTE — PROGRESS NOTES
Patient: Oumou Jacinto  YOB: 1955  Date of Service: 4/15/2024    Chief Complaints: Right shoulder pain right shoulder pain we really thought it was    Subjective:    History of Present Illness: Pt is seen in the office today with complaints of right shoulder pain and really thought it was more impingement I injected her she states she did get good relief about 50% she starts therapy on Wednesday.  We did see some changes on her shoulder x-ray I did get a CT scan which shows some chronic collapse of her long she has an appointment upcoming with a pulmonologist which I think is what she needs        Allergies:   Allergies   Allergen Reactions    Penicillins Swelling and Rash     OF TONGUE       Medications:   Home Medications:  Current Outpatient Medications on File Prior to Visit   Medication Sig    albuterol sulfate  (90 Base) MCG/ACT inhaler Inhale 2 puffs Every 4 (Four) Hours As Needed for Wheezing.    clindamycin (Cleocin) 300 MG capsule TAKE BOTH CAPSULES BY MOUTH ONE HOUR PRIOR TO DENTAL APPT    clonazePAM (KlonoPIN) 0.5 MG tablet TAKE 1 TABLET BY MOUTH FOUR TIMES A DAY AS NEEDED ANXIETY    HYDROcodone-acetaminophen (NORCO) 5-325 MG per tablet Take 1 tablet by mouth Every 4 (Four) Hours As Needed for Moderate Pain.    levalbuterol (XOPENEX) 0.63 MG/3ML nebulizer solution 1 UNIT INHALE THREE TIMES A DAY, AS NEEDED FOR SHORTNESS OF BREATH, VIA NEBULIZER.    meloxicam (MOBIC) 15 MG tablet TAKE 1 TABLET BY MOUTH EVERY DAY WITH FOOD    montelukast (SINGULAIR) 10 MG tablet Take 1 tablet by mouth Every Night.    nadolol (CORGARD) 20 MG tablet Take 1 tablet by mouth Daily.    ondansetron (Zofran) 4 MG tablet Take 1 tablet by mouth Every 8 (Eight) Hours As Needed for Nausea or Vomiting for up to 10 doses.    PARoxetine (PAXIL) 20 MG tablet Take 2 tablets by mouth Daily.    VITAMIN D PO Take  by mouth.     No current facility-administered medications on file prior to visit.     Current  Medications:  Scheduled Meds:  Continuous Infusions:No current facility-administered medications for this visit.    PRN Meds:.    I have reviewed the patient's medical history in detail and updated the computerized patient record.  Review and summarization of old records include:    Past Medical History:   Diagnosis Date    Anxiety     Arthritis     Asthma     Hypertension     Knee swelling Left knee    Left knee pain     Pemphigus vulgaris         Past Surgical History:   Procedure Laterality Date    LAPAROSCOPIC CHOLECYSTECTOMY      TOTAL KNEE ARTHROPLASTY Left 12/18/2023    Procedure: TOTAL KNEE ARTHROPLASTY;  Surgeon: Markus Addison MD;  Location: [facility] [place];  Service: Orthopedics;  Laterality: Left;    TRIGGER POINT INJECTION  Cortisone shots in my left knee        Social History     Occupational History    Not on file   Tobacco Use    Smoking status: Never     Passive exposure: Never    Smokeless tobacco: Never   Vaping Use    Vaping status: Never Used   Substance and Sexual Activity    Alcohol use: Not Currently     Comment: quit 10-15 years ago    Drug use: Never    Sexual activity: Not Currently     Partners: Male     Birth control/protection: Post-menopausal      Social History     Social History Narrative    Not on file        Family History   Problem Relation Age of Onset    Malig Hyperthermia Neg Hx        ROS: 14 point review of systems was performed and was negative except for documented findings in HPI and today's encounter.     Allergies:   Allergies   Allergen Reactions    Penicillins Swelling and Rash     OF TONGUE     Constitutional:  Denies fever, shaking or chills   Eyes:  Denies change in visual acuity   HENT:  Denies nasal congestion or sore throat   Respiratory:  Denies cough or shortness of breath   Cardiovascular:  Denies chest pain or severe LE edema   GI:  Denies abdominal pain, nausea, vomiting, bloody stools or diarrhea   Musculoskeletal:  Numbness, tingling, or loss of motor  function only as noted above in history of present illness.  : Denies painful urination or hematuria  Integument:  Denies rash, lesion or ulceration   Neurologic:  Denies headache or focal weakness  Endocrine:  Denies lymphadenopathy  Psych:  Denies confusion or change in mental status   Hem:  Denies active bleeding      Physical Exam: 68 y.o. female  Wt Readings from Last 3 Encounters:   03/11/24 112 kg (246 lb)   02/20/24 112 kg (247 lb 9.6 oz)   01/04/24 113 kg (249 lb)       There is no height or weight on file to calculate BMI.    There were no vitals filed for this visit.  Vital signs reviewed.   General Appearance:    Alert, cooperative, in no acute distress                    Ortho exam    Physical exam of the right shoulder reveals no overlying skin changes no lymphedema no lymphadenopathy.  Patient has active flexion 180 with mild symptoms abduction is similar external rotation is to 50 and internal rotation to the upper lumbar spine with mild symptoms.  Patient has good rotator cuff strength 4+ over 5 with isometric strength testing with pain.  Patient has a positive impingement and a positive Ulloa sign.  Patient has good cervical range of motion which is full and asymptomatic no radicular symptoms.  Patient has a normal elbow exam.  Good distal pulses are present  Patient has pain with overhead activity and a positive Neer sign and a positive empty can sign , a positive drop arm and a definitive painful arc              Assessment: Right shoulder impingement I think she will do better continue to improve with physical therapy    Plan: Continue physical therapy should she fail to improve or plateaus she will call me and the neck step would be an MRI I will have her make an appointment for 4 weeks if she is doing well she may call and cancel  Follow up as indicated.  Ice, elevate, and rest as needed.  Discussed conservative measures of pain control including ice, bracing.  Also talked about the  importance of strengthening   Sheridan Hall M.D.

## 2024-04-17 ENCOUNTER — TREATMENT (OUTPATIENT)
Dept: PHYSICAL THERAPY | Facility: CLINIC | Age: 69
End: 2024-04-17
Payer: COMMERCIAL

## 2024-04-17 DIAGNOSIS — M75.41 IMPINGEMENT SYNDROME OF RIGHT SHOULDER: ICD-10-CM

## 2024-04-17 DIAGNOSIS — Z74.09 IMPAIRED MOBILITY AND ADLS: ICD-10-CM

## 2024-04-17 DIAGNOSIS — M25.511 CHRONIC RIGHT SHOULDER PAIN: Primary | ICD-10-CM

## 2024-04-17 DIAGNOSIS — R29.898 SHOULDER WEAKNESS: ICD-10-CM

## 2024-04-17 DIAGNOSIS — G89.29 CHRONIC RIGHT SHOULDER PAIN: Primary | ICD-10-CM

## 2024-04-17 DIAGNOSIS — Z78.9 IMPAIRED MOBILITY AND ADLS: ICD-10-CM

## 2024-04-17 PROCEDURE — 97161 PT EVAL LOW COMPLEX 20 MIN: CPT | Performed by: PHYSICAL THERAPIST

## 2024-04-17 PROCEDURE — 97110 THERAPEUTIC EXERCISES: CPT | Performed by: PHYSICAL THERAPIST

## 2024-04-17 NOTE — PROGRESS NOTES
Physical Therapy Initial Evaluation and Plan of Care    [facility] [place] [address]  [phone] (phone)  [phone] (fax)      Patient: Oumou Jacinto   : 1955  Diagnosis/ICD-10 Code:  Chronic right shoulder pain [M25.511, G89.29]  Referring practitioner: Sheridan Hall MD  Date of Initial Visit: 2024  Today's Date: 2024  Patient seen for 1 sessions    Visit Diagnoses:    ICD-10-CM ICD-9-CM   1. Chronic right shoulder pain  M25.511 719.41    G89.29 338.29   2. Shoulder weakness  R29.898 719.61   3. Impaired mobility and ADLs  Z74.09 V49.89    Z78.9               Subjective Evaluation    History of Present Illness  Onset date: 2024.  Mechanism of injury: Onset of right shoulder pain in January after she had left TKA and was pushing herself up more with her hands   She saw ortho in March for her shoulder and had injection in her right shoulder    She still has some pain and stiffness reaching behind her back with the right arm.   The primary activity she is limited in inability to put her bra on from behind.  Initially she was not able to sleep on her right side and had pain with reaching.   Shoulder x-ray showed some AC joint arthritis and problem with her right lung and she followed up with CT scan and pulmonology appointment scheduled for next week.    Pain  Current pain ratin  At best pain ratin  At worst pain ratin  Location: R shoulder    Hand dominance: right             Objective          Static Posture     Comments  Mild rounding of her shoulders    Tenderness     Right Shoulder  Tenderness in the AC joint.     Active Range of Motion   Left Shoulder   Flexion: 155 degrees   Abduction: 165 degrees   Internal rotation 0°: 76 degrees   Internal rotation BTB: T8     Right Shoulder   Flexion: 140 degrees   Abduction: 155 degrees   Internal rotation 0°: 71 degrees   Internal rotation BTB: T10      Strength/Myotome Testing     Left Shoulder     Planes of Motion   Flexion: 5   Abduction: 4   External rotation at 0°: 5   Internal rotation at 0°: 5     Right Shoulder     Planes of Motion   Flexion: 4+   Abduction: 4-   External rotation at 0°: 5   External rotation at 90°: 5   Internal rotation at 0°: 5   Internal rotation at 90°: 4 (Mild pain)     Tests     Right Shoulder   Negative drop arm, Hawkin's, internal rotation lag sign, painful arc and Speed's.         See Exercise, Manual, and Modality Logs for complete treatment.   Access Code: [email or web address]  URL: [email or web address]  Date: 04/17/2024  Prepared by: Harmony Ellis    Exercises  - Standing Row with Anchored Resistance  - 1 x daily - 3 x weekly - 1 sets - 10 reps  - Shoulder External Rotation and Scapular Retraction with Resistance  - 1 x daily - 3 x weekly - 1 sets - 10 reps  - Standing Shoulder Flexion to 90 Degrees with Dumbbells  - 1 x daily - 3 x weekly - 1 sets - 10 reps  - Shoulder Abduction with Dumbbells - Thumbs Up  - 1 x daily - 3 x weekly - 1 sets - 10 reps  - Standing Pec Stretch at Wall  - 1 x daily - 3 x weekly - 1 sets - 3 reps - 10 seconds  hold  - Standing Shoulder Internal Rotation Stretch with Towel  - 1 x daily - 3 x weekly - 1 sets - 5 reps - 5-10 seconds  hold  - Standing Shoulder Internal Rotation Stretch with Hands Behind Back  - 1 x daily - 3 x weekly - 1 sets - 5 reps - 5-10 seconds  hold  - Standing Shoulder Flexion AAROM with Dowel  - 1 x daily - 3 x weekly - 1 sets - 4 reps - 5-10 seconds  hold    Functional Outcome Score: SPADI score is 26% disability highest for dressing and reaching behind her back         Assessment & Plan       Assessment  Impairments: abnormal or restricted ROM, activity intolerance, impaired physical strength, lacks appropriate home exercise program and pain with function   Functional limitations: reaching behind back   Assessment details: Oumou Jacinto is a 68 y.o. year-old right-hand  dominant female referred to physical therapy for right shoulder pain. She presents with a stable clinical presentation that is improved following steroid injection in March  She has no known comorbidities or personal factors that may affect her progress in the plan of care.  Signs and symptoms are consistent with physical therapy diagnosis of right shoulder pain with some mild stiffness and shoulder weakness compared to the left side and pain reaching behind her back.  She does not show signs of shoulder impingement today.. There is some tenderness at the AC joint.  She was instructed in shoulder exercises and follow-up in 3 weeks.  Prognosis: good    Goals  Plan Goals: PT Goal Recert Due Date: 07/15/24    STG Date to Achieve: 24    STG 1.  Patient will demonstrate an independent HEP for shoulder strength and ROM/flexibility without increased pain     STG 2 Patient will increase her active reaching behind her back to her bra line     STG 3. Patient will increase her right shoulder strength to 4+/5 to help with lifting     STG 4 Patient will demonstrate right shoulder AROM within 5 degrees of left side for overhead reaching.     LTG Date to Achieve: 07/15/24    LTG 1. Patient will have the ROM and strength to fasten her bra from the rear    LTG  2. Patient will report decreased functional disability on SPADI score from 26% to 10% or less         Plan  Therapy options: will be seen for skilled therapy services  Planned therapy interventions: functional ROM exercises, home exercise program, therapeutic activities, stretching, strengthening, neuromuscular re-education and manual therapy  Frequency: 2x month  Duration in weeks: 12  Treatment plan discussed with: patient        Timed:         Manual Therapy:    0     mins  58800;     Therapeutic Exercise:    15     mins  01266;     Neuromuscular Park:    0    mins  61869;    Therapeutic Activity:     0     mins  95560;     Gait Trainin     mins  95547;      Ultrasound:     0     mins  85269;    Self Care                       0     mins   74418      Un-Timed:  Electrical Stimulation:    0     mins  91806 ( );  Dry Needling  (1-2 muscles)            0     mins 42201 (Self-pay)  Dry Needling (3-4 muscles) 0     mins 21628 (Self-pay)  Dry Needling Trial    0     mins DRYNDLTRIAL  (No Charge)  Traction     0     mins 16955  Low Eval     25     Mins  68371  Mod Eval     0     Mins  19506  High Eval                       0     Mins  35817    Timed Treatment:   15   mins   Total Treatment:     40   mins    PT SIGNATURE: YANETH Durham License Number: [id number]    Electronically signed by Harmony Ellis PT, 04/17/24, 9:34 AM EDT    DATE TREATMENT INITIATED: 4/17/2024    Initial Certification  Certification Period: 7/16/2024  I certify that the therapy services are furnished while this patient is under my care.  The services outlined above are required by this patient, and will be reviewed every 90 days.     PHYSICIAN: Sheridan Hall MD   NPI: [id number]                                         DATE:     Please sign and return via fax to [phone] Thank you, [facility].

## 2024-05-15 ENCOUNTER — ANESTHESIA (OUTPATIENT)
Dept: GASTROENTEROLOGY | Facility: HOSPITAL | Age: 69
End: 2024-05-15
Payer: COMMERCIAL

## 2024-05-15 ENCOUNTER — ANESTHESIA EVENT (OUTPATIENT)
Dept: GASTROENTEROLOGY | Facility: HOSPITAL | Age: 69
End: 2024-05-15
Payer: COMMERCIAL

## 2024-05-15 ENCOUNTER — APPOINTMENT (OUTPATIENT)
Dept: GENERAL RADIOLOGY | Facility: HOSPITAL | Age: 69
End: 2024-05-15
Payer: COMMERCIAL

## 2024-05-15 ENCOUNTER — HOSPITAL ENCOUNTER (OUTPATIENT)
Facility: HOSPITAL | Age: 69
Setting detail: HOSPITAL OUTPATIENT SURGERY
Discharge: HOME | End: 2024-05-15
Attending: INTERNAL MEDICINE | Admitting: INTERNAL MEDICINE
Payer: COMMERCIAL

## 2024-05-15 VITALS
BODY MASS INDEX: 36.73 KG/M2 | RESPIRATION RATE: 15 BRPM | TEMPERATURE: 98.1 F | SYSTOLIC BLOOD PRESSURE: 127 MMHG | DIASTOLIC BLOOD PRESSURE: 83 MMHG | OXYGEN SATURATION: 99 % | HEART RATE: 68 BPM | WEIGHT: 248 LBS | HEIGHT: 69 IN

## 2024-05-15 DIAGNOSIS — J98.11 ATELECTASIS OF RIGHT LUNG: ICD-10-CM

## 2024-05-15 LAB
APPEARANCE FLD: ABNORMAL
COLOR FLD: ABNORMAL
GIE STN SPEC: NORMAL
GRAM STN SPEC: NORMAL
GRAM STN SPEC: NORMAL
LYMPHOCYTES NFR FLD MANUAL: 6 %
METHOD: ABNORMAL
MONOCYTES NFR FLD: 4 %
MONOS+MACROS NFR FLD: 21 %
NEUTROPHILS NFR FLD MANUAL: 69 %
NUC CELL # FLD: 743 /MM3
RBC # FLD AUTO: 780 /MM3

## 2024-05-15 PROCEDURE — 87186 SC STD MICRODIL/AGAR DIL: CPT | Performed by: INTERNAL MEDICINE

## 2024-05-15 PROCEDURE — 94761 N-INVAS EAR/PLS OXIMETRY MLT: CPT

## 2024-05-15 PROCEDURE — 87206 SMEAR FLUORESCENT/ACID STAI: CPT | Performed by: INTERNAL MEDICINE

## 2024-05-15 PROCEDURE — 71045 X-RAY EXAM CHEST 1 VIEW: CPT

## 2024-05-15 PROCEDURE — 25010000002 MIDAZOLAM PER 1 MG: Performed by: ANESTHESIOLOGY

## 2024-05-15 PROCEDURE — 88112 CYTOPATH CELL ENHANCE TECH: CPT | Performed by: INTERNAL MEDICINE

## 2024-05-15 PROCEDURE — 87205 SMEAR GRAM STAIN: CPT | Performed by: INTERNAL MEDICINE

## 2024-05-15 PROCEDURE — 94640 AIRWAY INHALATION TREATMENT: CPT

## 2024-05-15 PROCEDURE — 89051 BODY FLUID CELL COUNT: CPT | Performed by: INTERNAL MEDICINE

## 2024-05-15 PROCEDURE — 87116 MYCOBACTERIA CULTURE: CPT | Performed by: INTERNAL MEDICINE

## 2024-05-15 PROCEDURE — 25010000002 FENTANYL CITRATE (PF) 100 MCG/2ML SOLUTION: Performed by: NURSE ANESTHETIST, CERTIFIED REGISTERED

## 2024-05-15 PROCEDURE — 94799 UNLISTED PULMONARY SVC/PX: CPT

## 2024-05-15 PROCEDURE — 25010000002 PROPOFOL 10 MG/ML EMULSION: Performed by: NURSE ANESTHETIST, CERTIFIED REGISTERED

## 2024-05-15 PROCEDURE — 87102 FUNGUS ISOLATION CULTURE: CPT | Performed by: INTERNAL MEDICINE

## 2024-05-15 PROCEDURE — 87071 CULTURE AEROBIC QUANT OTHER: CPT | Performed by: INTERNAL MEDICINE

## 2024-05-15 PROCEDURE — 88305 TISSUE EXAM BY PATHOLOGIST: CPT | Performed by: INTERNAL MEDICINE

## 2024-05-15 PROCEDURE — 25010000002 PHENYLEPHRINE 10 MG/ML SOLUTION: Performed by: NURSE ANESTHETIST, CERTIFIED REGISTERED

## 2024-05-15 PROCEDURE — 25810000003 LACTATED RINGERS PER 1000 ML: Performed by: INTERNAL MEDICINE

## 2024-05-15 PROCEDURE — 25010000002 GLYCOPYRROLATE 0.2 MG/ML SOLUTION: Performed by: NURSE ANESTHETIST, CERTIFIED REGISTERED

## 2024-05-15 PROCEDURE — 87070 CULTURE OTHR SPECIMN AEROBIC: CPT | Performed by: INTERNAL MEDICINE

## 2024-05-15 RX ORDER — IPRATROPIUM BROMIDE AND ALBUTEROL SULFATE 2.5; .5 MG/3ML; MG/3ML
3 SOLUTION RESPIRATORY (INHALATION) ONCE
Status: COMPLETED | OUTPATIENT
Start: 2024-05-15 | End: 2024-05-15

## 2024-05-15 RX ORDER — SODIUM CHLORIDE 9 MG/ML
40 INJECTION, SOLUTION INTRAVENOUS AS NEEDED
Status: DISCONTINUED | OUTPATIENT
Start: 2024-05-15 | End: 2024-05-15 | Stop reason: HOSPADM

## 2024-05-15 RX ORDER — LIDOCAINE HYDROCHLORIDE 20 MG/ML
INJECTION, SOLUTION INFILTRATION; PERINEURAL AS NEEDED
Status: DISCONTINUED | OUTPATIENT
Start: 2024-05-15 | End: 2024-05-15 | Stop reason: SURG

## 2024-05-15 RX ORDER — PHENYLEPHRINE HYDROCHLORIDE 10 MG/ML
INJECTION INTRAVENOUS AS NEEDED
Status: DISCONTINUED | OUTPATIENT
Start: 2024-05-15 | End: 2024-05-15 | Stop reason: SURG

## 2024-05-15 RX ORDER — MIDAZOLAM HYDROCHLORIDE 1 MG/ML
1 INJECTION INTRAMUSCULAR; INTRAVENOUS
Status: DISCONTINUED | OUTPATIENT
Start: 2024-05-15 | End: 2024-05-15 | Stop reason: HOSPADM

## 2024-05-15 RX ORDER — SODIUM CHLORIDE 0.9 % (FLUSH) 0.9 %
10 SYRINGE (ML) INJECTION AS NEEDED
Status: DISCONTINUED | OUTPATIENT
Start: 2024-05-15 | End: 2024-05-15 | Stop reason: HOSPADM

## 2024-05-15 RX ORDER — MIDAZOLAM HYDROCHLORIDE 1 MG/ML
0.5 INJECTION INTRAMUSCULAR; INTRAVENOUS
Status: DISCONTINUED | OUTPATIENT
Start: 2024-05-15 | End: 2024-05-15

## 2024-05-15 RX ORDER — PROPOFOL 10 MG/ML
VIAL (ML) INTRAVENOUS AS NEEDED
Status: DISCONTINUED | OUTPATIENT
Start: 2024-05-15 | End: 2024-05-15 | Stop reason: SURG

## 2024-05-15 RX ORDER — SODIUM CHLORIDE 0.9 % (FLUSH) 0.9 %
10 SYRINGE (ML) INJECTION EVERY 12 HOURS SCHEDULED
Status: DISCONTINUED | OUTPATIENT
Start: 2024-05-15 | End: 2024-05-15 | Stop reason: HOSPADM

## 2024-05-15 RX ORDER — SODIUM CHLORIDE, SODIUM LACTATE, POTASSIUM CHLORIDE, CALCIUM CHLORIDE 600; 310; 30; 20 MG/100ML; MG/100ML; MG/100ML; MG/100ML
1000 INJECTION, SOLUTION INTRAVENOUS CONTINUOUS
Status: DISCONTINUED | OUTPATIENT
Start: 2024-05-15 | End: 2024-05-15 | Stop reason: HOSPADM

## 2024-05-15 RX ORDER — FENTANYL CITRATE 50 UG/ML
INJECTION, SOLUTION INTRAMUSCULAR; INTRAVENOUS AS NEEDED
Status: DISCONTINUED | OUTPATIENT
Start: 2024-05-15 | End: 2024-05-15 | Stop reason: SURG

## 2024-05-15 RX ORDER — GLYCOPYRROLATE 0.2 MG/ML
INJECTION INTRAMUSCULAR; INTRAVENOUS AS NEEDED
Status: DISCONTINUED | OUTPATIENT
Start: 2024-05-15 | End: 2024-05-15 | Stop reason: SURG

## 2024-05-15 RX ORDER — LIDOCAINE HYDROCHLORIDE 20 MG/ML
INJECTION, SOLUTION EPIDURAL; INFILTRATION; INTRACAUDAL; PERINEURAL AS NEEDED
Status: DISCONTINUED | OUTPATIENT
Start: 2024-05-15 | End: 2024-05-15 | Stop reason: HOSPADM

## 2024-05-15 RX ORDER — EPHEDRINE SULFATE 50 MG/ML
INJECTION, SOLUTION INTRAVENOUS AS NEEDED
Status: DISCONTINUED | OUTPATIENT
Start: 2024-05-15 | End: 2024-05-15 | Stop reason: SURG

## 2024-05-15 RX ORDER — LIDOCAINE HYDROCHLORIDE 10 MG/ML
0.5 INJECTION, SOLUTION INFILTRATION; PERINEURAL ONCE AS NEEDED
Status: DISCONTINUED | OUTPATIENT
Start: 2024-05-15 | End: 2024-05-15 | Stop reason: HOSPADM

## 2024-05-15 RX ORDER — SODIUM CHLORIDE, SODIUM LACTATE, POTASSIUM CHLORIDE, CALCIUM CHLORIDE 600; 310; 30; 20 MG/100ML; MG/100ML; MG/100ML; MG/100ML
9 INJECTION, SOLUTION INTRAVENOUS CONTINUOUS PRN
Status: DISCONTINUED | OUTPATIENT
Start: 2024-05-15 | End: 2024-05-15 | Stop reason: HOSPADM

## 2024-05-15 RX ADMIN — EPHEDRINE SULFATE 10 MG: 50 INJECTION INTRAVENOUS at 08:38

## 2024-05-15 RX ADMIN — GLYCOPYRROLATE 0.1 MG: 0.2 INJECTION INTRAMUSCULAR; INTRAVENOUS at 08:31

## 2024-05-15 RX ADMIN — PROPOFOL 40 MG: 10 INJECTION, EMULSION INTRAVENOUS at 08:16

## 2024-05-15 RX ADMIN — PHENYLEPHRINE HYDROCHLORIDE 100 MCG: 10 INJECTION INTRAVENOUS at 08:34

## 2024-05-15 RX ADMIN — LIDOCAINE HYDROCHLORIDE 40 MG: 20 INJECTION, SOLUTION INFILTRATION; PERINEURAL at 08:12

## 2024-05-15 RX ADMIN — LIDOCAINE HYDROCHLORIDE 60 MG: 20 INJECTION, SOLUTION INFILTRATION; PERINEURAL at 08:07

## 2024-05-15 RX ADMIN — PROPOFOL 150 MG: 10 INJECTION, EMULSION INTRAVENOUS at 08:07

## 2024-05-15 RX ADMIN — SODIUM CHLORIDE, POTASSIUM CHLORIDE, SODIUM LACTATE AND CALCIUM CHLORIDE 1000 ML: 600; 310; 30; 20 INJECTION, SOLUTION INTRAVENOUS at 07:38

## 2024-05-15 RX ADMIN — GLYCOPYRROLATE 0.1 MG: 0.2 INJECTION INTRAMUSCULAR; INTRAVENOUS at 08:35

## 2024-05-15 RX ADMIN — PHENYLEPHRINE HYDROCHLORIDE 100 MCG: 10 INJECTION INTRAVENOUS at 08:28

## 2024-05-15 RX ADMIN — PROPOFOL 180 MCG/KG/MIN: 10 INJECTION, EMULSION INTRAVENOUS at 08:11

## 2024-05-15 RX ADMIN — PROPOFOL 50 MG: 10 INJECTION, EMULSION INTRAVENOUS at 08:13

## 2024-05-15 RX ADMIN — PHENYLEPHRINE HYDROCHLORIDE 100 MCG: 10 INJECTION INTRAVENOUS at 08:30

## 2024-05-15 RX ADMIN — EPHEDRINE SULFATE 5 MG: 50 INJECTION INTRAVENOUS at 08:36

## 2024-05-15 RX ADMIN — PROPOFOL 50 MG: 10 INJECTION, EMULSION INTRAVENOUS at 08:11

## 2024-05-15 RX ADMIN — IPRATROPIUM BROMIDE AND ALBUTEROL SULFATE 3 ML: 2.5; .5 SOLUTION RESPIRATORY (INHALATION) at 09:08

## 2024-05-15 RX ADMIN — FENTANYL CITRATE 50 MCG: 50 INJECTION, SOLUTION INTRAMUSCULAR; INTRAVENOUS at 08:23

## 2024-05-15 RX ADMIN — MIDAZOLAM 1 MG: 1 INJECTION INTRAMUSCULAR; INTRAVENOUS at 07:52

## 2024-05-15 NOTE — ANESTHESIA POSTPROCEDURE EVALUATION
"Patient: Oumou Jacinto    Procedure Summary       Date: 05/15/24 [facility]    Anesthesia Start: 0758 Anesthesia Stop: 0854    Procedure: BRONCHOSCOPY with washing, BAL, biopsies, and brushings (Bronchus) Diagnosis:     Surgeons: Mikaela Shah MD Provider: Augustine Perez MD    Anesthesia Type: general ASA Status: 3            Anesthesia Type: general    Vitals  Vitals Value Taken Time   /81 05/15/24 0901   Temp     Pulse 73 05/15/24 0903   Resp 14 05/15/24 0851   SpO2 100 % 05/15/24 0903   Vitals shown include unfiled device data.        Post Anesthesia Care and Evaluation    Patient location during evaluation: PACU  Level of consciousness: awake  Pain management: adequate    Airway patency: patent  Anesthetic complications: No anesthetic complications  PONV Status: controlled  Cardiovascular status: acceptable  Respiratory status: acceptable  Hydration status: acceptable    Comments: /60 (BP Location: Left arm, Patient Position: Sitting)   Pulse 74   Temp 36.7 °C (98.1 °F) (Oral)   Resp 14   Ht 175.3 cm (69\")   Wt 112 kg (248 lb)   SpO2 94%   BMI 36.62 kg/m²       "

## 2024-05-15 NOTE — ANESTHESIA PREPROCEDURE EVALUATION
Anesthesia Evaluation     Patient summary reviewed and Nursing notes reviewed   NPO Solid Status: > 8 hours  NPO Liquid Status: > 2 hours           Airway   Mallampati: II  TM distance: >3 FB  Neck ROM: full  No difficulty expected  Dental - normal exam     Pulmonary - normal exam   (+) asthma,  Cardiovascular - normal exam  Exercise tolerance: good (4-7 METS)    (+) hypertension      Neuro/Psych  (+) psychiatric history Anxiety  GI/Hepatic/Renal/Endo - negative ROS     Musculoskeletal     Abdominal    Substance History - negative use     OB/GYN negative ob/gyn ROS         Other   arthritis,                 Anesthesia Plan    ASA 3     general     intravenous induction     Anesthetic plan, risks, benefits, and alternatives have been provided, discussed and informed consent has been obtained with: patient.    CODE STATUS:

## 2024-05-15 NOTE — DISCHARGE INSTRUCTIONS
For the next 24 hours patient needs to be with a responsible adult.    For 24 hours DO NOT drive, operate machinery, appliances, drink alcohol, make important decisions or sign legal documents.    Start with a light or bland diet if you are feeling sick to your stomach otherwise advance to regular diet as tolerated.    Follow recommendations on procedure report if provided by your doctor.    Call Dr Shah [phone]    Problems may include but not limited to: large amounts of bleeding, trouble breathing, repeated vomiting, severe unrelieved pain, fever or chills.

## 2024-05-15 NOTE — ANESTHESIA PROCEDURE NOTES
Airway  Date/Time: 5/15/2024 8:09 AM    General Information and Staff    Anesthesiologist: Augustine Perez MD  CRNA/CAA: Nora Pinto CRNA    Indications and Patient Condition  Indications for airway management: airway protection    Preoxygenated: yes  MILS maintained throughout  Mask difficulty assessment: 0 - not attempted    Final Airway Details  Final airway type: supraglottic airway      Successful airway: classic  Size 4     Number of attempts at approach: 1  Assessment: lips, teeth, and gum same as pre-op

## 2024-05-15 NOTE — PROGRESS NOTES
Patient: Oumou Jacinto  YOB: 1955  Date of Service: 5/15/2024    Chief Complaints: Right shoulder pain    Subjective:    History of Present Illness: Pt is seen in the office today with complaints of right shoulder pain  Her shoulder is doing great she states she has very little pain in the therapy and the injection helped that immensely.  She still dealing with her pulmonary issues she did see Dr. Shah who ordered a bunch of tests I did briefly look at those and explained them to her to the best of my ability Dr. Yu is out of town she is waiting for callback from their office but as far as the shoulder goes she feels like the shoulder is doing great she is happy with where she is we did as mentioned above injected it we start her into some physical therapy      Allergies:   Allergies   Allergen Reactions    Penicillins Swelling and Rash     OF TONGUE       Medications:   Home Medications:  Current Outpatient Medications on File Prior to Visit   Medication Sig    albuterol sulfate  (90 Base) MCG/ACT inhaler Inhale 2 puffs Every 4 (Four) Hours As Needed for Wheezing.    clindamycin (Cleocin) 300 MG capsule TAKE BOTH CAPSULES BY MOUTH ONE HOUR PRIOR TO DENTAL APPT    clonazePAM (KlonoPIN) 0.5 MG tablet Take 1 tablet by mouth 3 (Three) Times a Day As Needed.    HYDROcodone-acetaminophen (NORCO) 5-325 MG per tablet Take 1 tablet by mouth Every 4 (Four) Hours As Needed for Moderate Pain.    levalbuterol (XOPENEX) 0.63 MG/3ML nebulizer solution     meloxicam (MOBIC) 15 MG tablet TAKE 1 TABLET BY MOUTH EVERY DAY WITH FOOD (Patient taking differently: Take 1 tablet by mouth Daily As Needed. TAKE 1 TABLET BY MOUTH EVERY DAY WITH FOOD)    montelukast (SINGULAIR) 10 MG tablet Take 1 tablet by mouth Every Night.    Multiple Vitamins-Minerals (MULTIVITAMIN ADULT, MINERALS, PO) Take  by mouth.    nadolol (CORGARD) 20 MG tablet Take 1 tablet by mouth Daily.    ondansetron (Zofran) 4 MG tablet Take 1  tablet by mouth Every 8 (Eight) Hours As Needed for Nausea or Vomiting for up to 10 doses.    PARoxetine (PAXIL) 20 MG tablet Take 2 tablets by mouth Daily.    VITAMIN D PO Take 1 tablet by mouth 1 (One) Time Per Week.     Current Facility-Administered Medications on File Prior to Visit   Medication    [COMPLETED] ipratropium-albuterol (DUO-NEB) nebulizer solution 3 mL    [DISCONTINUED] ePHEDrine injection    [DISCONTINUED] fentaNYL citrate (PF) (SUBLIMAZE) injection    [DISCONTINUED] glycopyrrolate (ROBINUL) injection    [DISCONTINUED] lactated ringers infusion 1,000 mL    [DISCONTINUED] lactated ringers infusion    [DISCONTINUED] lidocaine (XYLOCAINE) 1 % injection 0.5 mL    [DISCONTINUED] lidocaine (XYLOCAINE) 2% injection    [DISCONTINUED] lidocaine PF 2% (XYLOCAINE) injection    [DISCONTINUED] midazolam (VERSED) injection 0.5 mg    [DISCONTINUED] midazolam (VERSED) injection 1 mg    [DISCONTINUED] phenylephrine (MANDEEP-SYNEPHRINE) injection    [DISCONTINUED] propofol (DIPRIVAN) infusion 10 mg/mL 100 mL    [DISCONTINUED] Propofol (DIPRIVAN) injection    [DISCONTINUED] sodium chloride 0.9 % flush 10 mL    [DISCONTINUED] sodium chloride 0.9 % flush 10 mL    [DISCONTINUED] sodium chloride 0.9 % flush 10 mL    [DISCONTINUED] sodium chloride 0.9 % infusion 40 mL     Current Medications:  Scheduled Meds:  Continuous Infusions:No current facility-administered medications for this visit.    PRN Meds:.    I have reviewed the patient's medical history in detail and updated the computerized patient record.  Review and summarization of old records include:    Past Medical History:   Diagnosis Date    Anxiety     Arthritis     Asthma     Hypertension     Knee swelling Left knee    Left knee pain     Lung disorder     complete right middle lobe lung collapse    Pemphigus vulgaris     Periarthritis of shoulder     Rotator cuff syndrome Jan. 1, 2024    Right shoulder pain.        Past Surgical History:   Procedure Laterality Date     LAPAROSCOPIC CHOLECYSTECTOMY      TOTAL KNEE ARTHROPLASTY Left 12/18/2023    Procedure: TOTAL KNEE ARTHROPLASTY;  Surgeon: Markus Addison MD;  [facility]: [facility] [place];  Service: Orthopedics;  Laterality: Left;    TRIGGER POINT INJECTION  Cortisone shots in my left knee        Social History     Occupational History    Not on file   Tobacco Use    Smoking status: Never     Passive exposure: Never    Smokeless tobacco: Never   Vaping Use    Vaping status: Never Used   Substance and Sexual Activity    Alcohol use: Not Currently     Comment: quit 10-15 years ago    Drug use: Never    Sexual activity: Yes     Partners: Male     Birth control/protection: Post-menopausal      Social History     Social History Narrative    Not on file        Family History   Problem Relation Age of Onset    Anesthesia problems Mother     Anesthesia problems Sister         Sensitive to Anesthesia    Malig Hyperthermia Neg Hx        ROS: 14 point review of systems was performed and was negative except for documented findings in HPI and today's encounter.     Allergies:   Allergies   Allergen Reactions    Penicillins Swelling and Rash     OF TONGUE     Constitutional:  Denies fever, shaking or chills   Eyes:  Denies change in visual acuity   HENT:  Denies nasal congestion or sore throat   Respiratory:  Denies cough or shortness of breath   Cardiovascular:  Denies chest pain or severe LE edema   GI:  Denies abdominal pain, nausea, vomiting, bloody stools or diarrhea   Musculoskeletal:  Numbness, tingling, or loss of motor function only as noted above in history of present illness.  : Denies painful urination or hematuria  Integument:  Denies rash, lesion or ulceration   Neurologic:  Denies headache or focal weakness  Endocrine:  Denies lymphadenopathy  Psych:  Denies confusion or change in mental status   Hem:  Denies active bleeding      Physical Exam: 68 y.o. female  Wt Readings from Last 3 Encounters:   05/15/24 112 kg (248 lb)    04/15/24 110 kg (243 lb)   03/11/24 112 kg (246 lb)       There is no height or weight on file to calculate BMI.    There were no vitals filed for this visit.  Vital signs reviewed.   General Appearance:    Alert, cooperative, in no acute distress                    Ortho exam      Physical exam of the right shoulder reveals no overlying skin changes no lymphedema no lymphadenopathy.  Patient has active flexion 180 with mild symptoms abduction is similar external rotation is to 50 and internal rotation to the upper lumbar spine with mild symptoms.  Patient has good rotator cuff strength 4+ over 5 with isometric strength testing with pain.  Patient has a positive impingement and a positive Ulloa sign.  Patient has good cervical range of motion which is full and asymptomatic no radicular symptoms.  Patient has a normal elbow exam.  Good distal pulses are present  Patient has pain with overhead activity and a positive Neer sign and a positive empty can sign , a positive drop arm and a definitive painful arc            Assessment: Right shoulder pain I do think this is impingement I think she is doing great at this time with therapy and the injection she is going to progress to home program she understands we could repeat her injection in June if needed she will follow-up as needed    Plan:   Follow up as indicated.  Ice, elevate, and rest as needed.  Discussed conservative measures of pain control including ice, bracing.  Also talked about the importance of strengthening   Sheridan Hall M.D.

## 2024-05-15 NOTE — OP NOTE
Bronchoscopy Procedure Note    Procedure:  Bronchoscopy, Diagnostic    Pre-Operative Diagnosis: Right lobe atelectasis    Post-Operative Diagnosis: Right middle lobe bronchomalacia, right upper lobe mucosal abnormality    Indication: Evaluation for the cause of the right middle lobe atelectasis    Anesthesia: Monitored Anesthesia Care (MAC)    Procedure Details: Patient was consented for the procedure with all risks and benefits of the procedure explained in detail.  Patient was given the opportunity to ask questions and all concerns were answered.  The bronchocope was inserted into the main airway via the oropharynx. An anatomical survey was done of the main airways and the subsegmental bronchus.  The findings are reported above.  A bronchoalveolar lavage was performed using aliquots of normal saline instilled into the airways then aspirated back.    Findings:  Vocal cords were normal  Trachea was normal  No evidence of any excessive retained secretion with scant amount of white normal secretion scattered.  Except for the posterior wall of the right upper lobe bronchus, the mucosa was abnormal, there was some irregular Prominence of the mucosa in the right upper lobe, this was biopsied and sent in a separate container  The left lung was normal with no evidence of any anatomical abnormalities or retained foreign bodies or abnormal secretion or mucosal irregularity or any endobronchial masses or lesion  The right middle lobe bronchus was significantly narrowed like a slight opening, the findings are more consistent with bronchomalacia, the bronchus distal to that main right middle lobe bronchus were normal.  The right lower lobe bronchus were normal with no evidence of any abnormal mucosal irregularity or mucosal staining or foreign body or secretion    Estimated Blood Loss:  Minimal           Specimens:  Washing from both lung fields  BAL from the right middle lobe  Endobronchial biopsy of the right upper lobe  posterior wall mucosa  Transbronchial biopsy of the lung of the right middle lobe parenchyma  Brushing of the right middle lobe                Complications:  None; patient tolerated the procedure well.           Disposition: PACU - hemodynamically stable.      Patient tolerated the procedure well.    While I was in the room and during my examination of the patient I wore gown, gloves, mask, eye protection and washed my hands before and after the encounter.  Proper enhanced droplet precautions and isolation precautions were taken.    Mikaela Shah MD  5/15/2024  08:45 EDT

## 2024-05-16 LAB
CYTO UR: NORMAL
LAB AP CASE REPORT: NORMAL
LAB AP CASE REPORT: NORMAL
LAB AP INTRADEPARTMENTAL CONSULT: NORMAL
NIGHT BLUE STAIN TISS: NORMAL
PATH REPORT.FINAL DX SPEC: NORMAL
PATH REPORT.FINAL DX SPEC: NORMAL
PATH REPORT.GROSS SPEC: NORMAL
PATH REPORT.GROSS SPEC: NORMAL

## 2024-05-19 LAB
BACTERIA SPEC AEROBE CULT: ABNORMAL
BACTERIA SPEC AEROBE CULT: ABNORMAL
BACTERIA SPEC RESP CULT: ABNORMAL
BACTERIA SPEC RESP CULT: ABNORMAL
GRAM STN SPEC: ABNORMAL

## 2024-05-20 ENCOUNTER — OFFICE VISIT (OUTPATIENT)
Dept: ORTHOPEDIC SURGERY | Facility: CLINIC | Age: 69
End: 2024-05-20
Payer: COMMERCIAL

## 2024-05-20 VITALS — HEIGHT: 70 IN | BODY MASS INDEX: 35.68 KG/M2 | WEIGHT: 249.2 LBS | TEMPERATURE: 97.6 F

## 2024-05-20 DIAGNOSIS — M75.41 IMPINGEMENT SYNDROME OF RIGHT SHOULDER: Primary | ICD-10-CM

## 2024-05-20 PROCEDURE — 99213 OFFICE O/P EST LOW 20 MIN: CPT | Performed by: ORTHOPAEDIC SURGERY

## 2024-05-22 LAB
FUNGUS WND CULT: NORMAL
FUNGUS WND CULT: NORMAL
MYCOBACTERIUM SPEC CULT: NORMAL
MYCOBACTERIUM SPEC CULT: NORMAL
NIGHT BLUE STAIN TISS: NORMAL
NIGHT BLUE STAIN TISS: NORMAL

## 2024-06-05 LAB
MYCOBACTERIUM SPEC CULT: NORMAL
MYCOBACTERIUM SPEC CULT: NORMAL
NIGHT BLUE STAIN TISS: NORMAL
NIGHT BLUE STAIN TISS: NORMAL

## 2024-06-10 LAB
FUNGUS WND CULT: NORMAL
FUNGUS WND CULT: NORMAL

## 2024-07-11 ENCOUNTER — DOCUMENTATION (OUTPATIENT)
Dept: PHYSICAL THERAPY | Facility: CLINIC | Age: 69
End: 2024-07-11
Payer: COMMERCIAL

## 2024-07-11 NOTE — PROGRESS NOTES
DISCHARGE SUMMARY  Discharge Summary from Physical Therapy Report    Oumou Jacinto was seen for 11 physical therapy visits. Treatment includedtherapeutic exercise, manual therapy, therapeutic activity, neuro-muscular retraining , gait training, and patient education with home exercise program . Progress to physical therapy goals was good. She was d/c w/ an independent HEP and provided pt education to self manage condition.     Goals  Plan Goals: STGs to be achieved by 02/19/24     STG 1 Patient will demonstrate an independent HEP progressed for knee strength and ROM/flexibility. MET     STG 2 Patient will demonstrate increased left knee AROM from 3-110 degrees to 0-120 degrees for improved functional mobility to improve getting shoes and socks on and getting in and out of car PARTIALLY MET     STG 3 Patient will progress to short community walking without cane with normal gait NOT MET      STG 4 Patient will increase left knee strength to 4/5 to negotiate stairs with light hand support on rail NOT MET     LTGs to to achieved by 04/06/24     LTG 1 Patient will stand up from chair without hand support with ease  MET     LTG 2 Patient will transfer to and from floor with external support to assist her in order to play with her grandchildren  NOT MET     LTG 3 Patient will report decreased functional disability on KOS ADL score from 45 % to 20 % or less to improve her mobility with walking, stairs and squatting  NOT MET    Date of last PT visit: 2/16/2024    Stephenie Jena, PT, DPT  Physical Therapist

## 2024-12-10 ENCOUNTER — OFFICE VISIT (OUTPATIENT)
Dept: ORTHOPEDIC SURGERY | Facility: CLINIC | Age: 69
End: 2024-12-10
Payer: COMMERCIAL

## 2024-12-10 VITALS — HEIGHT: 70 IN | TEMPERATURE: 98 F | BODY MASS INDEX: 36.12 KG/M2 | WEIGHT: 252.3 LBS

## 2024-12-10 DIAGNOSIS — Z96.652 STATUS POST TOTAL KNEE REPLACEMENT, LEFT: Primary | ICD-10-CM

## 2024-12-10 RX ORDER — LEVALBUTEROL INHALATION SOLUTION 1.25 MG/3ML
SOLUTION RESPIRATORY (INHALATION)
COMMUNITY
Start: 2024-09-01

## 2024-12-10 NOTE — PROGRESS NOTES
"Oumou Jacinto : 1955 MRN: [id number] DATE: 12/10/2024    DIAGNOSIS: Annual follow up left total knee      SUBJECTIVE:Patient returns today for a 1 year follow up of left total knee replacement. Patient reports doing well with no unusual complaints.  Patient reports that she has no pain, significant swelling or stiffness.  Denies any limitations or instability issues due to the knee.  Overall patient states that she is very happy with her postoperative outcome.  She is without any other significant complaints today.    OBJECTIVE:    Temp 98 °F (36.7 °C) (Temporal)   Ht 177.8 cm (70\")   Wt 114 kg (252 lb 4.8 oz)   BMI 36.20 kg/m²   Family History   Problem Relation Age of Onset    Anesthesia problems Mother     Anesthesia problems Sister         Sensitive to Anesthesia    Malig Hyperthermia Neg Hx      Past Medical History:   Diagnosis Date    Anxiety     Arthritis     Asthma     Frozen shoulder     Hypertension     Knee swelling Left knee    Left knee pain     Lung disorder     complete right middle lobe lung collapse    Pemphigus vulgaris     Periarthritis of shoulder     Rotator cuff syndrome 2024    Right shoulder pain.     Past Surgical History:   Procedure Laterality Date    BRONCHOSCOPY N/A 5/15/2024    Procedure: BRONCHOSCOPY with washing, BAL, biopsies, and brushings;  Surgeon: Mikaela Shah MD;  Location: [facility] ENDOSCOPY;  Service: Pulmonary;  Laterality: N/A;  pre: RML progressive atelectasis  post: same    LAPAROSCOPIC CHOLECYSTECTOMY      TOTAL KNEE ARTHROPLASTY Left 2023    Procedure: TOTAL KNEE ARTHROPLASTY;  Surgeon: Markus Addison MD;  [place] [facility] [place] [facility] [place]  Service: Orthopedics;  Laterality: Left;    TRIGGER POINT INJECTION  Cortisone shots in my left knee     Social History     Socioeconomic History    Marital status:    Tobacco Use    Smoking status: Never     Passive exposure: Never    Smokeless tobacco: Never   Vaping Use    Vaping status: Never Used "   Substance and Sexual Activity    Alcohol use: Never     Comment: quit 10-15 years ago    Drug use: Never    Sexual activity: Yes     Partners: Male     Birth control/protection: Post-menopausal     Review of Systems - a 14 point review of systems was performed. All systems were negative.    Exam:. The incision is well healed. Range of motion is measured at 0 to 125. The calf is soft and nontender with a negative Homans sign. Alignment is neutral. Good quad strength. There is no evidence of varus/valgus or flexion instability. No effusion. Intact to light touch with palpable distal pulses.     DIAGNOSTIC STUDIES  Xrays: 3 views of the left knee (AP, lateral, and sunrise) were ordered and reviewed for evaluation of recent knee replacement. They demonstrate a well positioned, well aligned knee replacement without complicating factors noted. In comparison with previous films there has been no change.    ASSESSMENT: Annual follow up left knee replacement.    PLAN:  Continue activities as tolerated    Follow up as needed      SHIRLEY Sosa  12/10/2024

## 2025-02-24 ENCOUNTER — OFFICE VISIT (OUTPATIENT)
Dept: OBSTETRICS AND GYNECOLOGY | Age: 70
End: 2025-02-24
Payer: COMMERCIAL

## 2025-02-24 VITALS
SYSTOLIC BLOOD PRESSURE: 128 MMHG | DIASTOLIC BLOOD PRESSURE: 74 MMHG | WEIGHT: 252 LBS | BODY MASS INDEX: 36.08 KG/M2 | HEIGHT: 70 IN

## 2025-02-24 DIAGNOSIS — N76.1 SUBACUTE VAGINITIS: ICD-10-CM

## 2025-02-24 DIAGNOSIS — Z12.31 ENCOUNTER FOR SCREENING MAMMOGRAM FOR MALIGNANT NEOPLASM OF BREAST: Primary | ICD-10-CM

## 2025-02-24 DIAGNOSIS — Z01.411 ENCOUNTER FOR GYNECOLOGICAL EXAMINATION WITH ABNORMAL FINDING: ICD-10-CM

## 2025-02-24 NOTE — PROGRESS NOTES
Routine Annual Visit    2025    Patient: Oumou Jacinto          MR#:[id number]      Chief Complaint   Patient presents with    Gynecologic Exam     New pt, MG 10/9/2023, Colonoscopy unknown date       History of Present Illness    69 y.o. female  who presents for annual exam.   She is doing well, no real complaints  Her daughter has some very mild disability, is also a patient of [facility]  She has a hx of pemphigus vulgaris and had been on remicade. Is in complete remission now, hasn't had treatment for a while. She did have extensive mouth, nose, vaginal involvement in the past. She has really terrible dyspareunia. Has tried some vaginal estrogen without help.   She still has her uterus, history of four vaginal deliveries      No LMP recorded. Patient is postmenopausal.  Obstetric History:  OB History          6    Para   4    Term   4            AB   2    Living   4         SAB   2    IAB        Ectopic        Molar        Multiple        Live Births   4               Menstrual History:     No LMP recorded. Patient is postmenopausal.       ________________________________________  Patient Active Problem List   Diagnosis    Colon cancer screening    Pemphigus vulgaris    Vaginal stenosis    Pain    Primary osteoarthritis of left knee       Past Medical History:   Diagnosis Date    Anxiety     Arthritis     Asthma     Frozen shoulder     Hypertension     Knee swelling Left knee    Left knee pain     Lung disorder     complete right middle lobe lung collapse    Pemphigus vulgaris     Periarthritis of shoulder     Rotator cuff syndrome 2024    Right shoulder pain.       Family History   Problem Relation Age of Onset    Anesthesia problems Mother     Anesthesia problems Sister         Sensitive to Anesthesia    Malig Hyperthermia Neg Hx        Past Surgical History:   Procedure Laterality Date    BRONCHOSCOPY N/A 5/15/2024    Procedure: BRONCHOSCOPY with washing, BAL, biopsies,  "and brushings;  Surgeon: Mikaela Shah MD;  Location: [facility] ENDOSCOPY;  Service: Pulmonary;  Laterality: N/A;  pre: RML progressive atelectasis  post: same    LAPAROSCOPIC CHOLECYSTECTOMY      TOTAL KNEE ARTHROPLASTY Left 12/18/2023    Procedure: TOTAL KNEE ARTHROPLASTY;  Surgeon: Markus Addison MD;  Location: [facility] OR [facility];  Service: Orthopedics;  Laterality: Left;    TRIGGER POINT INJECTION  Cortisone shots in my left knee       Social History     Tobacco Use   Smoking Status Never    Passive exposure: Never   Smokeless Tobacco Never       has a current medication list which includes the following prescription(s): clonazepam, montelukast, multiple vitamins-minerals, nadolol, paroxetine, albuterol sulfate hfa, and levalbuterol.  ________________________________________        Objective   Physical Exam    /74   Ht 177.8 cm (70\")   Wt 114 kg (252 lb)   BMI 36.16 kg/m²    BP Readings from Last 3 Encounters:   02/24/25 128/74   05/15/24 127/83   12/18/23 104/72      Wt Readings from Last 3 Encounters:   02/24/25 114 kg (252 lb)   12/10/24 114 kg (252 lb 4.8 oz)   05/20/24 113 kg (249 lb 3.2 oz)         BMI: Body mass index is 36.16 kg/m².       General:   alert, appears stated age, and cooperative   Neck: No thyromegaly or LAD   Abdomen: soft, non-tender, without masses or organomegaly   Breast: inspection negative, no nipple discharge or bleeding, no masses or nodularity palpable   Urethra and bladder: urethral meatus normal; bladder nontender to palpation;   Vulva: Normal but atrophic. Inner aspect of labia minora involved by pink desquamatous lesion   Vagina: The vagina  has extensive desquamatous appearance with ring shaped pattern   Cervix: multiparous appearance and no lesions   Uterus: normal size, non-tender, and anteverted   Adnexa: normal adnexa and no mass, fullness, tenderness       Assessment:    normal annual exam   Pemphigous vulgaris  R/o yeast vaginitis  menopause    Plan:    Plan     []  " Mammogram request made  []  PAP done  []  Labs:   []  GC/Chl/TV  []  DEXA scan   []  Referral for colonoscopy:     She has a history of pemphigus vulgaris and seems to have current involvement of the vagina likely. She notes prior bx at [facility] with confirmation of involvement, but has been in remission. Plan to discuss with her dermatologist Dr. Monterroso. This of course would cause terrible dyspareunia. Vaginitis swab collected today.    Counseling  [x] Menopause  [x]  Nutrition  [x]  Physical activity/regular exercise   [x]  Healthy weight  []  Injury prevention  []  Smoking cessation  []  Substance misuse/abuse  [x]  Sexual behavior  []  STD prevention  []  Contraception  []  Dental health  []  Mental health  []  Immunization  [x]  Encouraged SBE        Taty Aguirre MD  02/24/2025  14:06 EST

## 2025-02-26 ENCOUNTER — TELEPHONE (OUTPATIENT)
Dept: OBSTETRICS AND GYNECOLOGY | Age: 70
End: 2025-02-26

## 2025-02-26 LAB
A VAGINAE DNA VAG QL NAA+PROBE: ABNORMAL SCORE
BVAB2 DNA VAG QL NAA+PROBE: ABNORMAL SCORE
C ALBICANS DNA VAG QL NAA+PROBE: NEGATIVE
C GLABRATA DNA VAG QL NAA+PROBE: NEGATIVE
MEGA1 DNA VAG QL NAA+PROBE: ABNORMAL SCORE
T VAGINALIS DNA VAG QL NAA+PROBE: NEGATIVE

## 2025-02-26 NOTE — TELEPHONE ENCOUNTER
Provider: DR. BENNETT    Caller: Oumou Jacinto    Relationship to Patient: Self    Pharmacy:     Phone Number: [phone]    Reason for Call: MAMMOGRAM    When was the patient last seen: 02.24.25    PATIENT CALLING IN TO SCHEDULE MAMMOGRAM-NO IMPLANTS      THERE IS A REFERRAL IN THAT DR. BENNETT PLACED, PATIENT WANTS TO BE SCHEDULED AT [address] [facility]      PATIENT CAN BE REACHED AT  [phone]    THANK YOU

## 2025-03-26 ENCOUNTER — TELEPHONE (OUTPATIENT)
Dept: OBSTETRICS AND GYNECOLOGY | Age: 70
End: 2025-03-26

## 2025-03-26 NOTE — TELEPHONE ENCOUNTER
Sending as an FYI - Pt called to cancel todays appt. Pt stating she is doing well & does not feel the need to follow up. Pt stated she has not been able to see her dermatologist yet but has a follow up scheduled with him in July. I advised pt to call back & reschedule if any problems occur.

## 2025-04-09 ENCOUNTER — HOSPITAL ENCOUNTER (OUTPATIENT)
Facility: HOSPITAL | Age: 70
Discharge: HOME | End: 2025-04-09
Admitting: OBSTETRICS & GYNECOLOGY
Payer: COMMERCIAL

## 2025-04-09 DIAGNOSIS — Z12.31 ENCOUNTER FOR SCREENING MAMMOGRAM FOR MALIGNANT NEOPLASM OF BREAST: ICD-10-CM

## 2025-04-09 PROCEDURE — 77067 SCR MAMMO BI INCL CAD: CPT

## 2025-04-09 PROCEDURE — 77063 BREAST TOMOSYNTHESIS BI: CPT

## (undated) DEVICE — FRCP BX RADJAW4 PULM WO NDL STD1.8X2 100

## (undated) DEVICE — VITAL SIGNS™ JACKSON-REES CIRCUITS: Brand: VITAL SIGNS™

## (undated) DEVICE — PREMIUM WET SKIN PREP TRAY: Brand: MEDLINE INDUSTRIES, INC.

## (undated) DEVICE — APPL DURAPREP IODOPHOR APL 26ML

## (undated) DEVICE — TRAP FLD MINIVAC MEGADYNE 100ML

## (undated) DEVICE — PATIENT RETURN ELECTRODE, SINGLE-USE, CONTACT QUALITY MONITORING, ADULT, WITH 9FT CORD, FOR PATIENTS WEIGING OVER 33LBS. (15KG): Brand: MEGADYNE

## (undated) DEVICE — PK KN TOTL 40

## (undated) DEVICE — MSK AIRWY LARYNG LMA PILOT SZ4

## (undated) DEVICE — MEDI-VAC YANKAUER SUCTION HANDLE W/BULBOUS TIP: Brand: CARDINAL HEALTH

## (undated) DEVICE — SYS CLS SKIN PREMIERPRO EXOFINFUSION 22CM

## (undated) DEVICE — SOL IRR NACL 0.9PCT 3000ML

## (undated) DEVICE — DUAL CUT SAGITTAL BLADE

## (undated) DEVICE — DRSNG SURESITE WNDW 2.38X2.75

## (undated) DEVICE — SENSR O2 OXIMAX FNGR A/ 18IN NONSTR

## (undated) DEVICE — STERILE PATIENT PROTECTIVE PAD FOR IMP® KNEE POSITIONERS & COHESIVE WRAP (10 / CASE): Brand: DE MAYO KNEE POSITIONER®

## (undated) DEVICE — GLV SURG SENSICARE PI PF LF 7 GRN STRL

## (undated) DEVICE — NEEDLE, QUINCKE 22GX3.5": Brand: MEDLINE INDUSTRIES, INC.

## (undated) DEVICE — GLV SURG SENSICARE W/ALOE PF LF 7.5 STRL

## (undated) DEVICE — UNDERGLV SURG BIOGEL INDICATOR LF PF 7.5

## (undated) DEVICE — ANTIBACTERIAL UNDYED BRAIDED (POLYGLACTIN 910), SYNTHETIC ABSORBABLE SUTURE: Brand: COATED VICRYL

## (undated) DEVICE — ADAPT CLN BIOGUARD AIR/H2O DISP

## (undated) DEVICE — TRAP,MUCUS SPECIMEN, 80CC: Brand: MEDLINE

## (undated) DEVICE — TBG PENCL TELESCP MEGADYNE SMOKE EVAC 10FT

## (undated) DEVICE — SINGLE USE SUCTION VALVE MAJ-209: Brand: SINGLE USE SUCTION VALVE (STERILE)

## (undated) DEVICE — SINGLE USE BIOPSY VALVE MAJ-210: Brand: SINGLE USE BIOPSY VALVE (STERILE)

## (undated) DEVICE — 450 ML BOTTLE OF 0.05% CHLORHEXIDINE GLUCONATE IN 99.95% STERILE WATER FOR IRRIGATION, USP AND APPLICATOR.: Brand: IRRISEPT ANTIMICROBIAL WOUND LAVAGE

## (undated) DEVICE — BNDG ELAS ELITE V/CLOSE 6IN 5YD LF STRL

## (undated) DEVICE — THE STERILE LIGHT HANDLE COVER IS USED WITH STERIS SURGICAL LIGHTING AND VISUALIZATION SYSTEMS.

## (undated) DEVICE — GLV SURG SENSICARE PI MIC PF SZ7 LF STRL

## (undated) DEVICE — PREP SOL POVIDONE/IODINE BT 4OZ

## (undated) DEVICE — TUBING, SUCTION, 1/4" X 10', STRAIGHT: Brand: MEDLINE

## (undated) DEVICE — ADAPT SWVL FIBROPTIC BRONCH

## (undated) DEVICE — SUT VIC 1 CT1 36IN J947H

## (undated) DEVICE — MAT FLR ABSORBENT LG 4FT 10 2.5FT

## (undated) DEVICE — KT DRN EVAC WND PVC PCH WTROC RND 10F400

## (undated) DEVICE — GLV SURG SENSICARE W/ALOE PF LF 8 STRL

## (undated) DEVICE — 3M™ IOBAN™ 2 ANTIMICROBIAL INCISE DRAPE 6640EZ: Brand: IOBAN™ 2